# Patient Record
Sex: MALE | URBAN - METROPOLITAN AREA
[De-identification: names, ages, dates, MRNs, and addresses within clinical notes are randomized per-mention and may not be internally consistent; named-entity substitution may affect disease eponyms.]

---

## 2023-04-25 ENCOUNTER — INPATIENT (INPATIENT)
Facility: HOSPITAL | Age: 73
LOS: 5 days | Discharge: NURSING, REHAB OR LONG-TERM CARE | End: 2023-05-01
Attending: INTERNAL MEDICINE | Admitting: INTERNAL MEDICINE
Payer: MEDICARE

## 2023-04-25 VITALS
SYSTOLIC BLOOD PRESSURE: 73 MMHG | HEART RATE: 134 BPM | DIASTOLIC BLOOD PRESSURE: 51 MMHG | OXYGEN SATURATION: 94 % | RESPIRATION RATE: 50 BRPM

## 2023-04-25 DIAGNOSIS — Z87.81 PERSONAL HISTORY OF (HEALED) TRAUMATIC FRACTURE: Chronic | ICD-10-CM

## 2023-04-25 LAB
ALBUMIN SERPL ELPH-MCNC: 2.8 G/DL — LOW (ref 3.3–5)
ALP SERPL-CCNC: 173 U/L — HIGH (ref 40–120)
ALT FLD-CCNC: 41 U/L — SIGNIFICANT CHANGE UP (ref 12–78)
ANION GAP SERPL CALC-SCNC: 10 MMOL/L — SIGNIFICANT CHANGE UP (ref 5–17)
ANISOCYTOSIS BLD QL: SLIGHT — SIGNIFICANT CHANGE UP
APPEARANCE UR: ABNORMAL
APTT BLD: 26 SEC — LOW (ref 27.5–35.5)
AST SERPL-CCNC: 55 U/L — HIGH (ref 15–37)
BACTERIA # UR AUTO: ABNORMAL
BASOPHILS # BLD AUTO: 0 K/UL — SIGNIFICANT CHANGE UP (ref 0–0.2)
BASOPHILS NFR BLD AUTO: 0 % — SIGNIFICANT CHANGE UP (ref 0–2)
BILIRUB SERPL-MCNC: 1.6 MG/DL — HIGH (ref 0.2–1.2)
BILIRUB UR-MCNC: ABNORMAL
BUN SERPL-MCNC: 64 MG/DL — HIGH (ref 7–23)
CALCIUM SERPL-MCNC: 9.4 MG/DL — SIGNIFICANT CHANGE UP (ref 8.5–10.1)
CHLORIDE SERPL-SCNC: 142 MMOL/L — HIGH (ref 96–108)
CO2 SERPL-SCNC: 17 MMOL/L — LOW (ref 22–31)
COLOR SPEC: YELLOW — SIGNIFICANT CHANGE UP
CREAT SERPL-MCNC: 3.42 MG/DL — HIGH (ref 0.5–1.3)
D DIMER BLD IA.RAPID-MCNC: HIGH NG/ML DDU
DIFF PNL FLD: ABNORMAL
EGFR: 18 ML/MIN/1.73M2 — LOW
EOSINOPHIL # BLD AUTO: 0 K/UL — SIGNIFICANT CHANGE UP (ref 0–0.5)
EOSINOPHIL NFR BLD AUTO: 0 % — SIGNIFICANT CHANGE UP (ref 0–6)
GLUCOSE SERPL-MCNC: 171 MG/DL — HIGH (ref 70–99)
GLUCOSE UR QL: NEGATIVE MG/DL — SIGNIFICANT CHANGE UP
HCT VFR BLD CALC: 54.7 % — HIGH (ref 39–50)
HGB BLD-MCNC: 17 G/DL — SIGNIFICANT CHANGE UP (ref 13–17)
HYALINE CASTS # UR AUTO: ABNORMAL /LPF
INR BLD: 1.35 RATIO — HIGH (ref 0.88–1.16)
KETONES UR-MCNC: NEGATIVE — SIGNIFICANT CHANGE UP
LACTATE SERPL-SCNC: 7 MMOL/L — CRITICAL HIGH (ref 0.7–2)
LEUKOCYTE ESTERASE UR-ACNC: ABNORMAL
LYMPHOCYTES # BLD AUTO: 13 % — SIGNIFICANT CHANGE UP (ref 13–44)
LYMPHOCYTES # BLD AUTO: 2 K/UL — SIGNIFICANT CHANGE UP (ref 1–3.3)
MACROCYTES BLD QL: SLIGHT — SIGNIFICANT CHANGE UP
MANUAL SMEAR VERIFICATION: SIGNIFICANT CHANGE UP
MCHC RBC-ENTMCNC: 31.1 G/DL — LOW (ref 32–36)
MCHC RBC-ENTMCNC: 31.7 PG — SIGNIFICANT CHANGE UP (ref 27–34)
MCV RBC AUTO: 102.1 FL — HIGH (ref 80–100)
METAMYELOCYTES # FLD: 1 % — HIGH (ref 0–0)
MONOCYTES # BLD AUTO: 0.15 K/UL — SIGNIFICANT CHANGE UP (ref 0–0.9)
MONOCYTES NFR BLD AUTO: 1 % — LOW (ref 2–14)
NEUTROPHILS # BLD AUTO: 12.77 K/UL — HIGH (ref 1.8–7.4)
NEUTROPHILS NFR BLD AUTO: 64 % — SIGNIFICANT CHANGE UP (ref 43–77)
NEUTS BAND # BLD: 19 % — HIGH (ref 0–8)
NITRITE UR-MCNC: NEGATIVE — SIGNIFICANT CHANGE UP
NRBC # BLD: 0 /100 — SIGNIFICANT CHANGE UP (ref 0–0)
NRBC # BLD: SIGNIFICANT CHANGE UP /100 WBCS (ref 0–0)
PH UR: 5 — SIGNIFICANT CHANGE UP (ref 5–8)
PLAT MORPH BLD: NORMAL — SIGNIFICANT CHANGE UP
PLATELET # BLD AUTO: 113 K/UL — LOW (ref 150–400)
POTASSIUM SERPL-MCNC: 3.7 MMOL/L — SIGNIFICANT CHANGE UP (ref 3.5–5.3)
POTASSIUM SERPL-SCNC: 3.7 MMOL/L — SIGNIFICANT CHANGE UP (ref 3.5–5.3)
PROT SERPL-MCNC: 9.2 GM/DL — HIGH (ref 6–8.3)
PROT UR-MCNC: 30 MG/DL
PROTHROM AB SERPL-ACNC: 16.3 SEC — HIGH (ref 10.5–13.4)
RBC # BLD: 5.36 M/UL — SIGNIFICANT CHANGE UP (ref 4.2–5.8)
RBC # FLD: 13.7 % — SIGNIFICANT CHANGE UP (ref 10.3–14.5)
RBC BLD AUTO: ABNORMAL
RBC CASTS # UR COMP ASSIST: ABNORMAL /HPF (ref 0–4)
SODIUM SERPL-SCNC: 169 MMOL/L — CRITICAL HIGH (ref 135–145)
SP GR SPEC: 1.02 — SIGNIFICANT CHANGE UP (ref 1.01–1.02)
UROBILINOGEN FLD QL: 4 MG/DL
VARIANT LYMPHS # BLD: 2 % — SIGNIFICANT CHANGE UP (ref 0–6)
WBC # BLD: 15.39 K/UL — HIGH (ref 3.8–10.5)
WBC # FLD AUTO: 15.39 K/UL — HIGH (ref 3.8–10.5)
WBC UR QL: SIGNIFICANT CHANGE UP

## 2023-04-25 PROCEDURE — 99223 1ST HOSP IP/OBS HIGH 75: CPT

## 2023-04-25 PROCEDURE — 99291 CRITICAL CARE FIRST HOUR: CPT

## 2023-04-25 PROCEDURE — 93010 ELECTROCARDIOGRAM REPORT: CPT

## 2023-04-25 PROCEDURE — 71045 X-RAY EXAM CHEST 1 VIEW: CPT | Mod: 26

## 2023-04-25 RX ORDER — ONDANSETRON 8 MG/1
4 TABLET, FILM COATED ORAL EVERY 8 HOURS
Refills: 0 | Status: DISCONTINUED | OUTPATIENT
Start: 2023-04-25 | End: 2023-05-01

## 2023-04-25 RX ORDER — HEPARIN SODIUM 5000 [USP'U]/ML
5000 INJECTION INTRAVENOUS; SUBCUTANEOUS EVERY 8 HOURS
Refills: 0 | Status: DISCONTINUED | OUTPATIENT
Start: 2023-04-25 | End: 2023-04-25

## 2023-04-25 RX ORDER — HEPARIN SODIUM 5000 [USP'U]/ML
4000 INJECTION INTRAVENOUS; SUBCUTANEOUS ONCE
Refills: 0 | Status: COMPLETED | OUTPATIENT
Start: 2023-04-25 | End: 2023-04-25

## 2023-04-25 RX ORDER — SODIUM CHLORIDE 9 MG/ML
1000 INJECTION INTRAMUSCULAR; INTRAVENOUS; SUBCUTANEOUS
Refills: 0 | Status: DISCONTINUED | OUTPATIENT
Start: 2023-04-25 | End: 2023-04-25

## 2023-04-25 RX ORDER — VANCOMYCIN HCL 1 G
1000 VIAL (EA) INTRAVENOUS ONCE
Refills: 0 | Status: COMPLETED | OUTPATIENT
Start: 2023-04-25 | End: 2023-04-25

## 2023-04-25 RX ORDER — ACETAMINOPHEN 500 MG
750 TABLET ORAL ONCE
Refills: 0 | Status: COMPLETED | OUTPATIENT
Start: 2023-04-25 | End: 2023-04-25

## 2023-04-25 RX ORDER — LANOLIN ALCOHOL/MO/W.PET/CERES
3 CREAM (GRAM) TOPICAL AT BEDTIME
Refills: 0 | Status: DISCONTINUED | OUTPATIENT
Start: 2023-04-25 | End: 2023-05-01

## 2023-04-25 RX ORDER — PANTOPRAZOLE SODIUM 20 MG/1
40 TABLET, DELAYED RELEASE ORAL
Refills: 0 | Status: DISCONTINUED | OUTPATIENT
Start: 2023-04-25 | End: 2023-05-01

## 2023-04-25 RX ORDER — SODIUM CHLORIDE 9 MG/ML
1550 INJECTION INTRAMUSCULAR; INTRAVENOUS; SUBCUTANEOUS ONCE
Refills: 0 | Status: COMPLETED | OUTPATIENT
Start: 2023-04-25 | End: 2023-04-25

## 2023-04-25 RX ORDER — PIPERACILLIN AND TAZOBACTAM 4; .5 G/20ML; G/20ML
3.38 INJECTION, POWDER, LYOPHILIZED, FOR SOLUTION INTRAVENOUS EVERY 12 HOURS
Refills: 0 | Status: DISCONTINUED | OUTPATIENT
Start: 2023-04-25 | End: 2023-04-28

## 2023-04-25 RX ORDER — SODIUM CHLORIDE 9 MG/ML
1000 INJECTION INTRAMUSCULAR; INTRAVENOUS; SUBCUTANEOUS ONCE
Refills: 0 | Status: COMPLETED | OUTPATIENT
Start: 2023-04-25 | End: 2023-04-25

## 2023-04-25 RX ORDER — HEPARIN SODIUM 5000 [USP'U]/ML
INJECTION INTRAVENOUS; SUBCUTANEOUS
Qty: 25000 | Refills: 0 | Status: DISCONTINUED | OUTPATIENT
Start: 2023-04-25 | End: 2023-04-27

## 2023-04-25 RX ORDER — ACETAMINOPHEN 500 MG
650 TABLET ORAL EVERY 6 HOURS
Refills: 0 | Status: DISCONTINUED | OUTPATIENT
Start: 2023-04-25 | End: 2023-05-01

## 2023-04-25 RX ORDER — PIPERACILLIN AND TAZOBACTAM 4; .5 G/20ML; G/20ML
3.38 INJECTION, POWDER, LYOPHILIZED, FOR SOLUTION INTRAVENOUS ONCE
Refills: 0 | Status: COMPLETED | OUTPATIENT
Start: 2023-04-25 | End: 2023-04-25

## 2023-04-25 RX ORDER — HEPARIN SODIUM 5000 [USP'U]/ML
2000 INJECTION INTRAVENOUS; SUBCUTANEOUS EVERY 6 HOURS
Refills: 0 | Status: DISCONTINUED | OUTPATIENT
Start: 2023-04-25 | End: 2023-04-27

## 2023-04-25 RX ORDER — HEPARIN SODIUM 5000 [USP'U]/ML
4000 INJECTION INTRAVENOUS; SUBCUTANEOUS EVERY 6 HOURS
Refills: 0 | Status: DISCONTINUED | OUTPATIENT
Start: 2023-04-25 | End: 2023-04-27

## 2023-04-25 RX ORDER — SODIUM CHLORIDE 9 MG/ML
1000 INJECTION, SOLUTION INTRAVENOUS
Refills: 0 | Status: DISCONTINUED | OUTPATIENT
Start: 2023-04-25 | End: 2023-04-26

## 2023-04-25 RX ADMIN — HEPARIN SODIUM 4000 UNIT(S): 5000 INJECTION INTRAVENOUS; SUBCUTANEOUS at 23:57

## 2023-04-25 RX ADMIN — Medication 300 MILLIGRAM(S): at 17:31

## 2023-04-25 RX ADMIN — PIPERACILLIN AND TAZOBACTAM 200 GRAM(S): 4; .5 INJECTION, POWDER, LYOPHILIZED, FOR SOLUTION INTRAVENOUS at 15:46

## 2023-04-25 RX ADMIN — PIPERACILLIN AND TAZOBACTAM 3.38 GRAM(S): 4; .5 INJECTION, POWDER, LYOPHILIZED, FOR SOLUTION INTRAVENOUS at 16:00

## 2023-04-25 RX ADMIN — Medication 750 MILLIGRAM(S): at 18:00

## 2023-04-25 RX ADMIN — PIPERACILLIN AND TAZOBACTAM 25 GRAM(S): 4; .5 INJECTION, POWDER, LYOPHILIZED, FOR SOLUTION INTRAVENOUS at 22:12

## 2023-04-25 RX ADMIN — Medication 250 MILLIGRAM(S): at 19:43

## 2023-04-25 RX ADMIN — SODIUM CHLORIDE 100 MILLILITER(S): 9 INJECTION, SOLUTION INTRAVENOUS at 23:50

## 2023-04-25 RX ADMIN — SODIUM CHLORIDE 1550 MILLILITER(S): 9 INJECTION INTRAMUSCULAR; INTRAVENOUS; SUBCUTANEOUS at 15:46

## 2023-04-25 RX ADMIN — SODIUM CHLORIDE 1550 MILLILITER(S): 9 INJECTION INTRAMUSCULAR; INTRAVENOUS; SUBCUTANEOUS at 17:00

## 2023-04-25 RX ADMIN — SODIUM CHLORIDE 1000 MILLILITER(S): 9 INJECTION INTRAMUSCULAR; INTRAVENOUS; SUBCUTANEOUS at 21:52

## 2023-04-25 NOTE — H&P ADULT - ASSESSMENT
74 y/o M w/ PMHx of HTN, COPD, Dementia, Seizure disorder, CHF, Depression, BPH sent in from NH for respiratory distress, pt being admitted for Acute respiratory failure, severe sepsis due to HCAP    # Acute Respiratory failure  # HCAP w/ severe sepsis  - c/w Zosyn and Vanco   - f/u official XR report  - f/u blood cultures  - c/w supplemental oxygen    # TONNY  - BL renal function unknown  - c/w IVF  - avoid nephrotoxins    # Hypernatremia  - c/w 1/2 NS  - trend Na    # Bacteriuria  - c/w Zosyn  - f/u urine cultures         74 y/o M w/ PMHx of HTN, COPD, Dementia, Seizure disorder, CHF, Depression, BPH sent in from NH for respiratory distress, pt being admitted for Acute respiratory failure, severe sepsis due to HCAP    # Acute Hypoxic Respiratory failure  # severe sepsis  - CXR ? HCAP vs atelectasis   - Dimer [id number], lactate of 7 despite 2 L bolus  - treat for PE, place on Heparin gtt  - V/Q scan in am  - c/w Zosyn and Vanco   - f/u official XR report  - f/u blood cultures  - c/w supplemental oxygen    # TONNY  - BL renal function unknown  - c/w IVF  - avoid nephrotoxins    # Hypernatremia  - c/w 1/2 NS  - trend Na    # Bacteriuria  - c/w Zosyn  - f/u urine cultures    # Seizure disorder  - c/w Phenytoin  - f/u level    # DVT ppx - on heparin gtt for now    Pt DNR/DNI      Clear bilaterally, pupils equal, round and reactive to light.

## 2023-04-25 NOTE — H&P ADULT - NSHPLABSRESULTS_GEN_ALL_CORE
T(C): 36.1 (23 @ 20:34), Max: 39.1 (23 @ 16:27)  HR: 87 (23 @ 20:34) (87 - 134)  BP: 100/69 (23 @ 20:34) (73/51 - 119/97)  RR: 20 (23 @ 20:34) (20 - 50)  SpO2: 98% (23 @ 20:34) (83% - 100%)                        17.0   15.39 )-----------( 113      ( 2023 15:35 )             54.7         169<HH>  |  142<H>  |  64<H>  ----------------------------<  171<H>  3.7   |  17<L>  |  3.42<H>    Ca    9.4      2023 15:35    TPro  9.2<H>  /  Alb  2.8<L>  /  TBili  1.6<H>  /  DBili  x   /  AST  55<H>  /  ALT  41  /  AlkPhos  173<H>      LIVER FUNCTIONS - ( 2023 15:35 )  Alb: 2.8 g/dL / Pro: 9.2 gm/dL / ALK PHOS: 173 U/L / ALT: 41 U/L / AST: 55 U/L / GGT: x           PT/INR - ( 2023 15:35 )   PT: 16.3 sec;   INR: 1.35 ratio         PTT - ( 2023 15:35 )  PTT:26.0 sec  Urinalysis Basic - ( 2023 16:10 )    Color: Yellow / Appearance: Slightly Turbid / S.020 / pH: x  Gluc: x / Ketone: Negative  / Bili: Small / Urobili: 4 mg/dL   Blood: x / Protein: 30 mg/dL / Nitrite: Negative   Leuk Esterase: Trace / RBC: 3-5 /HPF / WBC 3-5   Sq Epi: x / Non Sq Epi: x / Bacteria: Many      acetaminophen     Tablet .. 650 milliGRAM(s) Oral every 6 hours PRN  aluminum hydroxide/magnesium hydroxide/simethicone Suspension 30 milliLiter(s) Oral every 4 hours PRN  heparin   Injectable 2000 Unit(s) IV Push every 6 hours PRN  heparin   Injectable 4000 Unit(s) IV Push once  heparin   Injectable 4000 Unit(s) IV Push every 6 hours PRN  heparin  Infusion.  Unit(s)/Hr IV Continuous <Continuous>  melatonin 3 milliGRAM(s) Oral at bedtime PRN  multivitamin 1 Tablet(s) Oral daily  ondansetron Injectable 4 milliGRAM(s) IV Push every 8 hours PRN  pantoprazole    Tablet 40 milliGRAM(s) Oral before breakfast  phenytoin   Capsule 200 milliGRAM(s) Oral two times a day  piperacillin/tazobactam IVPB.. 3.375 Gram(s) IV Intermittent every 12 hours  sodium chloride 0.45%. 1000 milliLiter(s) IV Continuous <Continuous>

## 2023-04-25 NOTE — H&P ADULT - NSHPPHYSICALEXAM_GEN_ALL_CORE
PHYSICAL EXAM:    Vital Signs Last 24 Hrs  T(C): 36.1 (25 Apr 2023 20:34), Max: 39.1 (25 Apr 2023 16:27)  T(F): 97 (25 Apr 2023 20:34), Max: 102.3 (25 Apr 2023 16:27)  HR: 87 (25 Apr 2023 20:34) (87 - 134)  BP: 100/69 (25 Apr 2023 20:34) (73/51 - 119/97)  BP(mean): --  RR: 20 (25 Apr 2023 20:34) (20 - 50)  SpO2: 98% (25 Apr 2023 20:34) (83% - 100%)    Parameters below as of 25 Apr 2023 20:34  Patient On (Oxygen Delivery Method): nasal cannula  O2 Flow (L/min): 4      GENERAL: Pt lying in bed comfortably in NAD  HEENT:  Atraumatic, EOMI, PERRL, conjunctiva and sclera clear, MMM  NECK: Supple, No JVD  CHEST/LUNG: Clear to auscultation bilaterally; No rales, rhonchi, wheezing or rubs. Unlabored respirations  HEART: Regular rate and rhythm; No murmurs, rubs, or gallops  ABDOMEN: Bowel sounds present; Soft, Nontender, Nondistended. No guarding or rigidity    EXTREMITIES:  2+ Peripheral Pulses, brisk capillary refill. No clubbing, cyanosis, or edema  NEUROLOGICAL:  Alert & Oriented X3, speech clear. Answers questions appropriately. Full and equal strength B/L upper and lower extremities. No deficits   MSK: FROM x 4 extremities   SKIN: No rashes or lesions PHYSICAL EXAM:    Vital Signs Last 24 Hrs  T(C): 36.1 (25 Apr 2023 20:34), Max: 39.1 (25 Apr 2023 16:27)  T(F): 97 (25 Apr 2023 20:34), Max: 102.3 (25 Apr 2023 16:27)  HR: 87 (25 Apr 2023 20:34) (87 - 134)  BP: 100/69 (25 Apr 2023 20:34) (73/51 - 119/97)  BP(mean): --  RR: 20 (25 Apr 2023 20:34) (20 - 50)  SpO2: 98% (25 Apr 2023 20:34) (83% - 100%)    Parameters below as of 25 Apr 2023 20:34  Patient On (Oxygen Delivery Method): nasal cannula  O2 Flow (L/min): 4      GENERAL: Pt lying in bed   HEENT:  Atraumatic, EOMI, PERRL, conjunctiva and sclera clear, VERY dry MM, pood dentition  NECK: Supple,  CHEST/LUNG: Clear to auscultation bilaterally  HEART: Regular rate and rhythm  ABDOMEN: Bowel sounds present; Soft, Nontender, Nondistended.  EXTREMITIES:  2+ Peripheral Pulses, brisk capillary refill. No edema  NEUROLOGICAL:  Alert mildly agitated, rest of exam limited   MSK: no overt deformity  SKIN: warm, dry

## 2023-04-25 NOTE — ED ADULT TRIAGE NOTE - CHIEF COMPLAINT QUOTE
Patient BIBA respiratory distress from [facility]. On nonrebreather  DNI/DNR

## 2023-04-25 NOTE — ED ADULT TRIAGE NOTE - HEIGHT IN CM
160.02 Dutasteride Pregnancy And Lactation Text: This medication is absolutely contraindicated in women, especially during pregnancy and breast feeding. Feminization of male fetuses is possible if taking while pregnant.

## 2023-04-25 NOTE — ED ADULT NURSE NOTE - NSIMPLEMENTINTERV_GEN_ALL_ED
Implemented All Fall Risk Interventions:  [place] to call system. Call bell, personal items and telephone within reach. Instruct patient to call for assistance. Room bathroom lighting operational. Non-slip footwear when patient is off stretcher. Physically safe environment: no spills, clutter or unnecessary equipment. Stretcher in lowest position, wheels locked, appropriate side rails in place. Provide visual cue, wrist band, yellow gown, etc. Monitor gait and stability. Monitor for mental status changes and reorient to person, place, and time. Review medications for side effects contributing to fall risk. Reinforce activity limits and safety measures with patient and family.

## 2023-04-25 NOTE — ED PROVIDER NOTE - PHYSICAL EXAMINATION
Kevin:  General: No distress.  altered  HEENT: WNL  Chest/Lungs: scattered wheeze, No retractions, No increased work of breathing, Normal rate  Heart: S1S2 RRR, No M/R/G, Pules equal Bilaterally in upper and lower extremities distally  Abd: soft, NT/ND, No guarding, No rebound.  No hernias, no palpable masses.  Extrem: FROM in all joints, no significant edema noted, No ulcers.  Cap refil < 2sec.  Skin: No rash noted, warm dry.  Neuro:  Grossly normal.  No difficulty ambulating. No focal deficits.  Psychiatric: No evidence of delusions. No SI/HI.

## 2023-04-25 NOTE — ED ADULT NURSE NOTE - OBJECTIVE STATEMENT
Patient BIBA respiratory distress from [facility]. On nonrebreather  DNI/DNR   pt placed in RES A bed blue MD at bedside pt placed on monitor IV established labs drawn pt made comfortable pt responsive to painful stimuli moves madeleine and moves eye but with no verbal response pt warm to touch will continue to monitor

## 2023-04-25 NOTE — H&P ADULT - HISTORY OF PRESENT ILLNESS
72 y/o M w/ PMHx of HTN, COPD, Dementia sent in from NH for respiratory distress. Pt lethargic, unable to obtain further hx    ED Course  Vitals BP 81/47, , RR 39 Tmax 102.3  Labs WBC 15.59 19% bands Na 169, Cl 142, BUN/Cr 64/3.42 CXR pre-england R sided infiltrate, UA +ve bacteriuria  Pt given 1.6 L bolus and Zosyn    72 y/o M w/ PMHx of HTN, COPD, Dementia, Seizure disorder, CHF, Depression, BPH sent in from NH for respiratory distress. Pt lethargic, unable to obtain further hx. Per EMS report, RN at [facility] found pt around 1pm in respiratory distress SPO2 665 on RA, pt also noted to have a fever.     ED Course  Vitals BP 81/47, , RR 39 Tmax 102.3  Labs WBC 15.59 19% bands Na 169, Cl 142, BUN/Cr 64/3.42 CXR pre-england R sided infiltrate, UA +ve bacteriuria  Pt given 1.6 L bolus and Zosyn    74 y/o M w/ PMHx of HTN, COPD, Dementia, Seizure disorder, CHF, Depression, BPH sent in from NH for respiratory distress. Pt awake, unable to obtain further hx. Per EMS report, RN at [facility] found pt around 1pm in respiratory distress SPO2 65 on RA, pt also noted to have a fever.     ED Course  Vitals BP 81/47, , RR 39 Tmax 102.3  Labs WBC 15.59 19% bands Na 169, Cl 142, BUN/Cr 64/3.42 CXR pre-england R sided infiltrate, UA +ve bacteriuria  Pt given 1.6 L bolus and Zosyn     74 y/o M w/ PMHx of HTN, COPD, Dementia, Seizure disorder, CHF, Depression, BPH sent in from NH for respiratory distress. Pt awake, confused, kept repeating "I want to go home" unable to obtain further hx due to MS Per EMS report, RN at [facility] found pt around 1pm in respiratory distress SPO2 65 on RA, pt also noted to have a fever.     ED Course  Initial itals BP 73/51, , RR 50 Tmax 102.3 SPO2 94% on 100% NRB  Labs WBC 15.59 19% bands Na 169, Cl 142, BUN/Cr 64/3.42 CXR pre-england R sided infiltrate, UA +ve bacteriuria  Pt given 1.6 L bolus and Zosyn

## 2023-04-26 LAB
ALBUMIN SERPL ELPH-MCNC: 2.3 G/DL — LOW (ref 3.3–5)
ALP SERPL-CCNC: 117 U/L — SIGNIFICANT CHANGE UP (ref 40–120)
ALT FLD-CCNC: 44 U/L — SIGNIFICANT CHANGE UP (ref 12–78)
ANION GAP SERPL CALC-SCNC: 3 MMOL/L — LOW (ref 5–17)
ANION GAP SERPL CALC-SCNC: 6 MMOL/L — SIGNIFICANT CHANGE UP (ref 5–17)
ANION GAP SERPL CALC-SCNC: 6 MMOL/L — SIGNIFICANT CHANGE UP (ref 5–17)
APTT BLD: >200 SEC — CRITICAL HIGH (ref 27.5–35.5)
APTT BLD: >200 SEC — SIGNIFICANT CHANGE UP (ref 27.5–35.5)
AST SERPL-CCNC: 96 U/L — HIGH (ref 15–37)
BASOPHILS # BLD AUTO: 0.09 K/UL — SIGNIFICANT CHANGE UP (ref 0–0.2)
BASOPHILS NFR BLD AUTO: 0.7 % — SIGNIFICANT CHANGE UP (ref 0–2)
BILIRUB SERPL-MCNC: 1.2 MG/DL — SIGNIFICANT CHANGE UP (ref 0.2–1.2)
BUN SERPL-MCNC: 48 MG/DL — HIGH (ref 7–23)
BUN SERPL-MCNC: 61 MG/DL — HIGH (ref 7–23)
BUN SERPL-MCNC: 71 MG/DL — HIGH (ref 7–23)
CALCIUM SERPL-MCNC: 5.2 MG/DL — CRITICAL LOW (ref 8.5–10.1)
CALCIUM SERPL-MCNC: 7.6 MG/DL — LOW (ref 8.5–10.1)
CALCIUM SERPL-MCNC: 8.2 MG/DL — LOW (ref 8.5–10.1)
CHLORIDE SERPL-SCNC: 135 MMOL/L — HIGH (ref 96–108)
CHLORIDE SERPL-SCNC: 139 MMOL/L — HIGH (ref 96–108)
CHLORIDE SERPL-SCNC: 142 MMOL/L — HIGH (ref 96–108)
CO2 SERPL-SCNC: 15 MMOL/L — LOW (ref 22–31)
CO2 SERPL-SCNC: 19 MMOL/L — LOW (ref 22–31)
CO2 SERPL-SCNC: 20 MMOL/L — LOW (ref 22–31)
CREAT SERPL-MCNC: 2.35 MG/DL — HIGH (ref 0.5–1.3)
CREAT SERPL-MCNC: 3.36 MG/DL — HIGH (ref 0.5–1.3)
CREAT SERPL-MCNC: 3.58 MG/DL — HIGH (ref 0.5–1.3)
CULTURE RESULTS: NO GROWTH — SIGNIFICANT CHANGE UP
EGFR: 17 ML/MIN/1.73M2 — LOW
EGFR: 19 ML/MIN/1.73M2 — LOW
EGFR: 29 ML/MIN/1.73M2 — LOW
EOSINOPHIL # BLD AUTO: 0.02 K/UL — SIGNIFICANT CHANGE UP (ref 0–0.5)
EOSINOPHIL NFR BLD AUTO: 0.2 % — SIGNIFICANT CHANGE UP (ref 0–6)
GLUCOSE SERPL-MCNC: 155 MG/DL — HIGH (ref 70–99)
GLUCOSE SERPL-MCNC: 241 MG/DL — HIGH (ref 70–99)
GLUCOSE SERPL-MCNC: 273 MG/DL — HIGH (ref 70–99)
HCT VFR BLD CALC: 45.6 % — SIGNIFICANT CHANGE UP (ref 39–50)
HGB BLD-MCNC: 14.3 G/DL — SIGNIFICANT CHANGE UP (ref 13–17)
IMM GRANULOCYTES NFR BLD AUTO: 0.8 % — SIGNIFICANT CHANGE UP (ref 0–0.9)
LACTATE SERPL-SCNC: 2.3 MMOL/L — HIGH (ref 0.7–2)
LACTATE SERPL-SCNC: 6.1 MMOL/L — CRITICAL HIGH (ref 0.7–2)
LYMPHOCYTES # BLD AUTO: 17.9 % — SIGNIFICANT CHANGE UP (ref 13–44)
LYMPHOCYTES # BLD AUTO: 2.28 K/UL — SIGNIFICANT CHANGE UP (ref 1–3.3)
MAGNESIUM SERPL-MCNC: 2.4 MG/DL — SIGNIFICANT CHANGE UP (ref 1.6–2.6)
MCHC RBC-ENTMCNC: 31.3 PG — SIGNIFICANT CHANGE UP (ref 27–34)
MCHC RBC-ENTMCNC: 31.4 G/DL — LOW (ref 32–36)
MCV RBC AUTO: 99.8 FL — SIGNIFICANT CHANGE UP (ref 80–100)
MONOCYTES # BLD AUTO: 0.79 K/UL — SIGNIFICANT CHANGE UP (ref 0–0.9)
MONOCYTES NFR BLD AUTO: 6.2 % — SIGNIFICANT CHANGE UP (ref 2–14)
NEUTROPHILS # BLD AUTO: 9.45 K/UL — HIGH (ref 1.8–7.4)
NEUTROPHILS NFR BLD AUTO: 74.2 % — SIGNIFICANT CHANGE UP (ref 43–77)
NRBC # BLD: 0 /100 WBCS — SIGNIFICANT CHANGE UP (ref 0–0)
PHENYTOIN FREE SERPL-MCNC: 7.3 UG/ML — LOW (ref 10–20)
PLATELET # BLD AUTO: 62 K/UL — LOW (ref 150–400)
POTASSIUM SERPL-MCNC: 2.6 MMOL/L — CRITICAL LOW (ref 3.5–5.3)
POTASSIUM SERPL-MCNC: 2.9 MMOL/L — CRITICAL LOW (ref 3.5–5.3)
POTASSIUM SERPL-MCNC: 3.5 MMOL/L — SIGNIFICANT CHANGE UP (ref 3.5–5.3)
POTASSIUM SERPL-SCNC: 2.6 MMOL/L — CRITICAL LOW (ref 3.5–5.3)
POTASSIUM SERPL-SCNC: 2.9 MMOL/L — CRITICAL LOW (ref 3.5–5.3)
POTASSIUM SERPL-SCNC: 3.5 MMOL/L — SIGNIFICANT CHANGE UP (ref 3.5–5.3)
PROT SERPL-MCNC: 7.6 GM/DL — SIGNIFICANT CHANGE UP (ref 6–8.3)
RBC # BLD: 4.57 M/UL — SIGNIFICANT CHANGE UP (ref 4.2–5.8)
RBC # FLD: 13.6 % — SIGNIFICANT CHANGE UP (ref 10.3–14.5)
SODIUM SERPL-SCNC: 156 MMOL/L — HIGH (ref 135–145)
SODIUM SERPL-SCNC: 164 MMOL/L — CRITICAL HIGH (ref 135–145)
SODIUM SERPL-SCNC: 165 MMOL/L — CRITICAL HIGH (ref 135–145)
SPECIMEN SOURCE: SIGNIFICANT CHANGE UP
WBC # BLD: 12.73 K/UL — HIGH (ref 3.8–10.5)
WBC # FLD AUTO: 12.73 K/UL — HIGH (ref 3.8–10.5)

## 2023-04-26 PROCEDURE — 99233 SBSQ HOSP IP/OBS HIGH 50: CPT

## 2023-04-26 PROCEDURE — 78582 LUNG VENTILAT&PERFUS IMAGING: CPT | Mod: 26

## 2023-04-26 RX ORDER — POTASSIUM CHLORIDE 20 MEQ
40 PACKET (EA) ORAL ONCE
Refills: 0 | Status: DISCONTINUED | OUTPATIENT
Start: 2023-04-26 | End: 2023-04-26

## 2023-04-26 RX ORDER — POTASSIUM CHLORIDE 20 MEQ
20 PACKET (EA) ORAL
Refills: 0 | Status: DISCONTINUED | OUTPATIENT
Start: 2023-04-26 | End: 2023-04-26

## 2023-04-26 RX ORDER — POTASSIUM CHLORIDE 20 MEQ
40 PACKET (EA) ORAL EVERY 4 HOURS
Refills: 0 | Status: DISCONTINUED | OUTPATIENT
Start: 2023-04-26 | End: 2023-04-26

## 2023-04-26 RX ORDER — POTASSIUM CHLORIDE 20 MEQ
40 PACKET (EA) ORAL EVERY 4 HOURS
Refills: 0 | Status: COMPLETED | OUTPATIENT
Start: 2023-04-26 | End: 2023-04-26

## 2023-04-26 RX ORDER — SODIUM CHLORIDE 9 MG/ML
1000 INJECTION, SOLUTION INTRAVENOUS
Refills: 0 | Status: COMPLETED | OUTPATIENT
Start: 2023-04-26 | End: 2023-04-26

## 2023-04-26 RX ORDER — SODIUM CHLORIDE 9 MG/ML
1000 INJECTION, SOLUTION INTRAVENOUS
Refills: 0 | Status: COMPLETED | OUTPATIENT
Start: 2023-04-26 | End: 2023-04-27

## 2023-04-26 RX ADMIN — HEPARIN SODIUM 700 UNIT(S)/HR: 5000 INJECTION INTRAVENOUS; SUBCUTANEOUS at 23:58

## 2023-04-26 RX ADMIN — HEPARIN SODIUM 0 UNIT(S)/HR: 5000 INJECTION INTRAVENOUS; SUBCUTANEOUS at 12:36

## 2023-04-26 RX ADMIN — Medication 200 MILLIGRAM(S): at 17:06

## 2023-04-26 RX ADMIN — Medication 40 MILLIEQUIVALENT(S): at 21:12

## 2023-04-26 RX ADMIN — HEPARIN SODIUM 900 UNIT(S)/HR: 5000 INJECTION INTRAVENOUS; SUBCUTANEOUS at 07:33

## 2023-04-26 RX ADMIN — Medication 40 MILLIEQUIVALENT(S): at 17:07

## 2023-04-26 RX ADMIN — PIPERACILLIN AND TAZOBACTAM 25 GRAM(S): 4; .5 INJECTION, POWDER, LYOPHILIZED, FOR SOLUTION INTRAVENOUS at 17:07

## 2023-04-26 RX ADMIN — HEPARIN SODIUM 0 UNIT(S)/HR: 5000 INJECTION INTRAVENOUS; SUBCUTANEOUS at 22:50

## 2023-04-26 RX ADMIN — PIPERACILLIN AND TAZOBACTAM 25 GRAM(S): 4; .5 INJECTION, POWDER, LYOPHILIZED, FOR SOLUTION INTRAVENOUS at 06:28

## 2023-04-26 RX ADMIN — HEPARIN SODIUM 900 UNIT(S)/HR: 5000 INJECTION INTRAVENOUS; SUBCUTANEOUS at 03:49

## 2023-04-26 RX ADMIN — HEPARIN SODIUM 900 UNIT(S)/HR: 5000 INJECTION INTRAVENOUS; SUBCUTANEOUS at 00:02

## 2023-04-26 RX ADMIN — Medication 1 TABLET(S): at 17:06

## 2023-04-26 RX ADMIN — PANTOPRAZOLE SODIUM 40 MILLIGRAM(S): 20 TABLET, DELAYED RELEASE ORAL at 06:28

## 2023-04-26 RX ADMIN — Medication 200 MILLIGRAM(S): at 06:28

## 2023-04-26 RX ADMIN — SODIUM CHLORIDE 150 MILLILITER(S): 9 INJECTION, SOLUTION INTRAVENOUS at 20:50

## 2023-04-26 RX ADMIN — HEPARIN SODIUM 800 UNIT(S)/HR: 5000 INJECTION INTRAVENOUS; SUBCUTANEOUS at 14:23

## 2023-04-26 RX ADMIN — HEPARIN SODIUM 900 UNIT(S)/HR: 5000 INJECTION INTRAVENOUS; SUBCUTANEOUS at 07:36

## 2023-04-26 RX ADMIN — HEPARIN SODIUM 800 UNIT(S)/HR: 5000 INJECTION INTRAVENOUS; SUBCUTANEOUS at 19:46

## 2023-04-26 RX ADMIN — SODIUM CHLORIDE 1000 MILLILITER(S): 9 INJECTION, SOLUTION INTRAVENOUS at 20:50

## 2023-04-26 NOTE — ED ADULT NURSE REASSESSMENT NOTE - NS ED NURSE REASSESS COMMENT FT1
Clarify Potassium PO with Dr Rapp, Pt cant tolerate Big pill.
D dimer elevated, pt remains sating well on 4L NC, not in respiratory distress. Dr. Leigh aware, treatment as per orders. Pt not upgraded to telemetry at this time.
Pt fluids ran longer than prescribed time due to IV site in the AC and pt unable to follow commands. Dr. Leigh aware. Elevated lactate level Another fluid bolus prescribed to run over an hour. Pt hx of CHF, started fluids slow.
Report from Fritz RN. Introduced self and identified pt. Received pt alert & confused, anxiously moving around in stretcher. IV intact, flushes, large amount of bruising around the site. Pt on NRB mask, saturation hard to obtain due to movement, pt unable to follow commands. IV Vancomycin over due and lactate due to be draw. On cardiac monitor NSR.
Pt continues to pull at lines, removing NRB mask. Dr. Leigh at the bedside, placed on 4L NC, maintaining o2 sat of 94-95%, again difficult to obtain good pleth due to patient movement and pulling at lines.

## 2023-04-26 NOTE — CONSULT NOTE ADULT - SUBJECTIVE AND OBJECTIVE BOX
Patient chart reviewed, full consult to follow.     Thank you for the courtesy of this consultation. [facility] NEPHROLOGY [facility], [facility]  NEPHROLOGY AND HYPERTENSION  [address]  [address]  [place] [address]  [phone]    MD WILTON SOLORZANO MD YELENA ROSENBERG, MD BINNY KOSHY, MD CHRISTOPHER CAPUTO, MD EDWARD BOVER, MD      Information from chart:  "Patient is a 73y old  Male who presents with a chief complaint of Severe Sepsis, Hypoxic respiratory failure (2023 16:27)    HPI:      72 y/o M w/ PMHx of HTN, COPD, Dementia, Seizure disorder, CHF, Depression, BPH sent in from NH for respiratory distress. Pt awake, confused, kept repeating "I want to go home" unable to obtain further hx due to MS Per EMS report, RN at [facility] found pt around 1pm in respiratory distress SPO2 65 on RA, pt also noted to have a fever.     ED Course  Initial itals BP 73/51, , RR 50 Tmax 102.3 SPO2 94% on 100% NRB  Labs WBC 15.59 19% bands Na 169, Cl 142, BUN/Cr 64/3.42 CXR pre-england R sided infiltrate, UA +ve bacteriuria  Pt given 1.6 L bolus and Zosyn (2023 18:16)   "    Noted to have hypernatremia ; Cr 3's   PAST MEDICAL & SURGICAL HISTORY:  History of cognitive deficit      COPD, moderate      Chronic CHF      S/p tibial fracture        FAMILY HISTORY:  No pertinent family history in first degree relatives      Allergies    No Known Allergies    Intolerances      Home Medications:  midodrine 10 mg oral tablet: 1 tab(s) orally once a day as needed for low BP (2023 22:38)  Multiple Vitamins oral tablet: 1 orally once a day (2023 22:38)  omeprazole 40 mg oral delayed release capsule: 1 orally once a day (2023 22:38)  phenytoin 200 mg oral capsule, extended release: 1 orally 2 times a day (2023 22:39)  Tylenol 325 mg oral tablet: 2 tab(s) orally every 6 hours as needed for  mild pain (2023 22:37)    MEDICATIONS  (STANDING):  dextrose 5%. 1000 milliLiter(s) (150 mL/Hr) IV Continuous <Continuous>  heparin  Infusion.  Unit(s)/Hr (9 mL/Hr) IV Continuous <Continuous>  multivitamin 1 Tablet(s) Oral daily  pantoprazole    Tablet 40 milliGRAM(s) Oral before breakfast  phenytoin   Capsule 200 milliGRAM(s) Oral two times a day  piperacillin/tazobactam IVPB.. 3.375 Gram(s) IV Intermittent every 12 hours    MEDICATIONS  (PRN):  acetaminophen     Tablet .. 650 milliGRAM(s) Oral every 6 hours PRN Temp greater or equal to 38C (100.4F), Mild Pain (1 - 3)  aluminum hydroxide/magnesium hydroxide/simethicone Suspension 30 milliLiter(s) Oral every 4 hours PRN Dyspepsia  heparin   Injectable 4000 Unit(s) IV Push every 6 hours PRN For aPTT less than 40  heparin   Injectable 2000 Unit(s) IV Push every 6 hours PRN For aPTT between 40 - 57  melatonin 3 milliGRAM(s) Oral at bedtime PRN Insomnia  ondansetron Injectable 4 milliGRAM(s) IV Push every 8 hours PRN Nausea and/or Vomiting    Vital Signs Last 24 Hrs  T(C): 36.3 (2023 16:58), Max: 36.6 (2023 04:00)  T(F): 97.3 (2023 16:58), Max: 97.8 (2023 04:00)  HR: 82 (2023 16:58) (74 - 86)  BP: 109/71 (2023 16:58) (100/71 - 119/97)  BP(mean): --  RR: 19 (2023 16:58) (15 - 23)  SpO2: 94% (2023 16:58) (93% - 97%)    Parameters below as of 2023 16:58  Patient On (Oxygen Delivery Method): nasal cannula  O2 Flow (L/min): 6      Daily     Daily Weight in k.3 (2023 04:00)    23 @ 07:  -  23 @ 23:15  --------------------------------------------------------  IN: 260 mL / OUT: 0 mL / NET: 260 mL      CAPILLARY BLOOD GLUCOSE        PHYSICAL EXAM:      T(C): 36.3 (23 @ 16:58), Max: 36.6 (23 @ 04:00)  HR: 82 (23 @ 16:58) (74 - 86)  BP: 109/71 (23 @ 16:58) (100/71 - 119/97)  RR: 19 (23 @ 16:58) (15 - 23)  SpO2: 94% (23 @ 16:58) (93% - 97%)  Wt(kg): --  Lungs clear  Heart S1S2  Abd soft NT ND  Extremities:   tr edema                  164<HH>  |  142<H>  |  71<H>  ----------------------------<  155<H>  2.9<LL>   |  19<L>  |  3.58<H>    Ca    8.2<L>      2023 10:47  Mg     2.4         TPro  7.6  /  Alb  2.3<L>  /  TBili  1.2  /  DBili  x   /  AST  96<H>  /  ALT  44  /  AlkPhos  117                            14.3   12.73 )-----------( 62       ( 2023 10:47 )             45.6     Creatinine Trend: 3.58<--, 3.36<--, 2.35<--, 3.42<--  Urinalysis Basic - ( 2023 16:10 )    Color: Yellow / Appearance: Slightly Turbid / S.020 / pH: x  Gluc: x / Ketone: Negative  / Bili: Small / Urobili: 4 mg/dL   Blood: x / Protein: 30 mg/dL / Nitrite: Negative   Leuk Esterase: Trace / RBC: 3-5 /HPF / WBC 3-5   Sq Epi: x / Non Sq Epi: x / Bacteria: Many            Assessment   TONNY CKD degree unclear; hypernatremia; PNA   R/O PE    Plan  Continue IVF D5W;   Renal imaging   Will follow     Bassam Tyson MD        Patient chart reviewed, full consult to follow.     Thank you for the courtesy of this consultation.

## 2023-04-27 LAB
ANION GAP SERPL CALC-SCNC: 4 MMOL/L — LOW (ref 5–17)
APTT BLD: 143.4 SEC — CRITICAL HIGH (ref 27.5–35.5)
BASOPHILS # BLD AUTO: 0.09 K/UL — SIGNIFICANT CHANGE UP (ref 0–0.2)
BASOPHILS NFR BLD AUTO: 0.7 % — SIGNIFICANT CHANGE UP (ref 0–2)
BUN SERPL-MCNC: 69 MG/DL — HIGH (ref 7–23)
CA-I BLD-SCNC: <3 MG/DL — LOW (ref 4.5–5.6)
CALCIUM SERPL-MCNC: 8.2 MG/DL — LOW (ref 8.5–10.1)
CHLORIDE SERPL-SCNC: 132 MMOL/L — HIGH (ref 96–108)
CO2 SERPL-SCNC: 20 MMOL/L — LOW (ref 22–31)
CREAT SERPL-MCNC: 3.42 MG/DL — HIGH (ref 0.5–1.3)
EGFR: 18 ML/MIN/1.73M2 — LOW
EOSINOPHIL # BLD AUTO: 0.3 K/UL — SIGNIFICANT CHANGE UP (ref 0–0.5)
EOSINOPHIL NFR BLD AUTO: 2.4 % — SIGNIFICANT CHANGE UP (ref 0–6)
GLUCOSE SERPL-MCNC: 138 MG/DL — HIGH (ref 70–99)
HCT VFR BLD CALC: 41.2 % — SIGNIFICANT CHANGE UP (ref 39–50)
HGB BLD-MCNC: 13 G/DL — SIGNIFICANT CHANGE UP (ref 13–17)
IMM GRANULOCYTES NFR BLD AUTO: 1.3 % — HIGH (ref 0–0.9)
LACTATE SERPL-SCNC: 2.6 MMOL/L — HIGH (ref 0.7–2)
LYMPHOCYTES # BLD AUTO: 16 % — SIGNIFICANT CHANGE UP (ref 13–44)
LYMPHOCYTES # BLD AUTO: 2.04 K/UL — SIGNIFICANT CHANGE UP (ref 1–3.3)
MCHC RBC-ENTMCNC: 31.6 G/DL — LOW (ref 32–36)
MCHC RBC-ENTMCNC: 31.6 PG — SIGNIFICANT CHANGE UP (ref 27–34)
MCV RBC AUTO: 100 FL — SIGNIFICANT CHANGE UP (ref 80–100)
MONOCYTES # BLD AUTO: 0.8 K/UL — SIGNIFICANT CHANGE UP (ref 0–0.9)
MONOCYTES NFR BLD AUTO: 6.3 % — SIGNIFICANT CHANGE UP (ref 2–14)
NEUTROPHILS # BLD AUTO: 9.35 K/UL — HIGH (ref 1.8–7.4)
NEUTROPHILS NFR BLD AUTO: 73.3 % — SIGNIFICANT CHANGE UP (ref 43–77)
NRBC # BLD: 0 /100 WBCS — SIGNIFICANT CHANGE UP (ref 0–0)
PLATELET # BLD AUTO: 58 K/UL — LOW (ref 150–400)
POTASSIUM SERPL-MCNC: 3.1 MMOL/L — LOW (ref 3.5–5.3)
POTASSIUM SERPL-SCNC: 3.1 MMOL/L — LOW (ref 3.5–5.3)
RBC # BLD: 4.12 M/UL — LOW (ref 4.2–5.8)
RBC # FLD: 13.8 % — SIGNIFICANT CHANGE UP (ref 10.3–14.5)
SODIUM SERPL-SCNC: 156 MMOL/L — HIGH (ref 135–145)
WBC # BLD: 12.75 K/UL — HIGH (ref 3.8–10.5)
WBC # FLD AUTO: 12.75 K/UL — HIGH (ref 3.8–10.5)

## 2023-04-27 PROCEDURE — 99233 SBSQ HOSP IP/OBS HIGH 50: CPT

## 2023-04-27 RX ORDER — HEPARIN SODIUM 5000 [USP'U]/ML
5000 INJECTION INTRAVENOUS; SUBCUTANEOUS EVERY 8 HOURS
Refills: 0 | Status: DISCONTINUED | OUTPATIENT
Start: 2023-04-27 | End: 2023-05-01

## 2023-04-27 RX ORDER — POTASSIUM CHLORIDE 20 MEQ
20 PACKET (EA) ORAL
Refills: 0 | Status: COMPLETED | OUTPATIENT
Start: 2023-04-27 | End: 2023-04-27

## 2023-04-27 RX ORDER — SODIUM CHLORIDE 9 MG/ML
1000 INJECTION, SOLUTION INTRAVENOUS
Refills: 0 | Status: DISCONTINUED | OUTPATIENT
Start: 2023-04-27 | End: 2023-05-01

## 2023-04-27 RX ADMIN — HEPARIN SODIUM 0 UNIT(S)/HR: 5000 INJECTION INTRAVENOUS; SUBCUTANEOUS at 08:27

## 2023-04-27 RX ADMIN — SODIUM CHLORIDE 150 MILLILITER(S): 9 INJECTION, SOLUTION INTRAVENOUS at 05:11

## 2023-04-27 RX ADMIN — HEPARIN SODIUM 700 UNIT(S)/HR: 5000 INJECTION INTRAVENOUS; SUBCUTANEOUS at 07:21

## 2023-04-27 RX ADMIN — Medication 200 MILLIGRAM(S): at 05:12

## 2023-04-27 RX ADMIN — PIPERACILLIN AND TAZOBACTAM 25 GRAM(S): 4; .5 INJECTION, POWDER, LYOPHILIZED, FOR SOLUTION INTRAVENOUS at 05:11

## 2023-04-27 RX ADMIN — PIPERACILLIN AND TAZOBACTAM 25 GRAM(S): 4; .5 INJECTION, POWDER, LYOPHILIZED, FOR SOLUTION INTRAVENOUS at 18:14

## 2023-04-27 RX ADMIN — Medication 50 MILLIEQUIVALENT(S): at 16:13

## 2023-04-27 RX ADMIN — HEPARIN SODIUM 5000 UNIT(S): 5000 INJECTION INTRAVENOUS; SUBCUTANEOUS at 21:54

## 2023-04-27 RX ADMIN — SODIUM CHLORIDE 100 MILLILITER(S): 9 INJECTION, SOLUTION INTRAVENOUS at 16:14

## 2023-04-27 RX ADMIN — HEPARIN SODIUM 600 UNIT(S)/HR: 5000 INJECTION INTRAVENOUS; SUBCUTANEOUS at 09:50

## 2023-04-27 RX ADMIN — PANTOPRAZOLE SODIUM 40 MILLIGRAM(S): 20 TABLET, DELAYED RELEASE ORAL at 05:31

## 2023-04-27 RX ADMIN — Medication 50 MILLIEQUIVALENT(S): at 13:07

## 2023-04-27 RX ADMIN — Medication 1 TABLET(S): at 16:13

## 2023-04-27 RX ADMIN — Medication 200 MILLIGRAM(S): at 18:14

## 2023-04-27 NOTE — CHART NOTE - NSCHARTNOTEFT_GEN_A_CORE
Patient seen and examined at bedside.  Used  ID# [id number], patient with limited participation in English or Comoran and speech difficult to understand.  Patient from [facility].  Lacks ability to make medical decisions at this time.  No known surrogate.  Has MOLST from 2021 indicating DNR/I.  Consulted Ethics for help in establishing if patient is under auspices of [facility].  Palliative consult to follow.

## 2023-04-27 NOTE — CONSULT NOTE ADULT - SUBJECTIVE AND OBJECTIVE BOX
FULL CONSULT TO FOLLOW    Gabriela Smith D.Min., RN-BSN, [facility]  Medical Ethics  [phone]     CLINICAL SUMMARY:      73 year old male with respiratory distress from long term care facility. Past medical history of hypertension, chronic obstructive pulmonary disease, congestive heart failure, seizure disorder, dementia, depression.     In ED patient found to be febrile (102.3), hypotensive (73/51) Tachycardic at 134, 100% oxygen saturation on NRB. WBC at 15.59, NA+ at 169. Cl at 142, BUN/Creatinine at 64/3.4, K+ 2.6, Lactate, blood at 7.0, UA + , chest xray with right sided infiltrate.  Diagnosed with sepsis due  to HCAP.   Current labs: WBC 12.75m Na+ 156, K+ 3/1, chloride, 132, BUN/Create 69/3.42 lactate at 2/6  Palliative medicine consulted as well as nephrology. Patient pending renal ultrasound.     Patient unable to participate in exams, or questions.   In his packet from the nursing home, a copy of a MOLST from 7/6/2021 reflects DNR/DNI, with physician signatures.     Ethics consult requested due to questions regarding the MOLST that accompanied the patient from the NH as well as questions regarding possible I/DD vs. dementia.     Prognosis Estimate (survival in hrs, days, wks, mos, yrs): unstable  Patient Decision-Making Capacity: 0Has 1 no capacity  Patient Aware of:  Diagnosis:  0 yes  No  1    Prognosis:   yes 0 No 1      Name of medical decision-maker should patient lack capacity (relationship):  unknown  Other Decision-Maker (i.e., HCA or Surrogate) Aware of:  Diagnosis: 0Yes 0      Prognosis:  0 Yes   0  Other Stake-Holders:    Evidence of Patient’s Preference of Life-Sustaining Treatment (Written or Oral) MOLST   Resuscitation status:   DNR: 1 0No      DNI: 1  0No        DISCUSSIONS: [id number] met with patient at bedside. Patient lethargic and unable to engage in conversation and answer simple questions.     [id number] spoke with assistant director of nursing at nursing [place] who deferred to Social Work and a message was left.     1440 left message for [facility].    4/28/2023- 0900 spoke with [facility], patient is not listed under [facility].

## 2023-04-27 NOTE — PROVIDER CONTACT NOTE (CRITICAL VALUE NOTIFICATION) - ACTION/TREATMENT ORDERED:
Treatment as per orders
Following heparin monogram, as per monogram will stopped for 60min will be reduce by 1 unit/hr
See orders
See orders.
replace potassium

## 2023-04-28 DIAGNOSIS — Z51.5 ENCOUNTER FOR PALLIATIVE CARE: ICD-10-CM

## 2023-04-28 DIAGNOSIS — N17.9 ACUTE KIDNEY FAILURE, UNSPECIFIED: ICD-10-CM

## 2023-04-28 DIAGNOSIS — R53.2 FUNCTIONAL QUADRIPLEGIA: ICD-10-CM

## 2023-04-28 DIAGNOSIS — E43 UNSPECIFIED SEVERE PROTEIN-CALORIE MALNUTRITION: ICD-10-CM

## 2023-04-28 DIAGNOSIS — A41.9 SEPSIS, UNSPECIFIED ORGANISM: ICD-10-CM

## 2023-04-28 DIAGNOSIS — F03.90 UNSPECIFIED DEMENTIA WITHOUT BEHAVIORAL DISTURBANCE: ICD-10-CM

## 2023-04-28 LAB
ANION GAP SERPL CALC-SCNC: 5 MMOL/L — SIGNIFICANT CHANGE UP (ref 5–17)
BUN SERPL-MCNC: 55 MG/DL — HIGH (ref 7–23)
CALCIUM SERPL-MCNC: 8.3 MG/DL — LOW (ref 8.5–10.1)
CHLORIDE SERPL-SCNC: 124 MMOL/L — HIGH (ref 96–108)
CO2 SERPL-SCNC: 21 MMOL/L — LOW (ref 22–31)
CREAT SERPL-MCNC: 3.17 MG/DL — HIGH (ref 0.5–1.3)
EGFR: 20 ML/MIN/1.73M2 — LOW
GLUCOSE SERPL-MCNC: 122 MG/DL — HIGH (ref 70–99)
HCT VFR BLD CALC: 41.3 % — SIGNIFICANT CHANGE UP (ref 39–50)
HGB BLD-MCNC: 13.1 G/DL — SIGNIFICANT CHANGE UP (ref 13–17)
MCHC RBC-ENTMCNC: 31.4 PG — SIGNIFICANT CHANGE UP (ref 27–34)
MCHC RBC-ENTMCNC: 31.7 G/DL — LOW (ref 32–36)
MCV RBC AUTO: 99 FL — SIGNIFICANT CHANGE UP (ref 80–100)
NRBC # BLD: 0 /100 WBCS — SIGNIFICANT CHANGE UP (ref 0–0)
PLATELET # BLD AUTO: 60 K/UL — LOW (ref 150–400)
POTASSIUM SERPL-MCNC: 3.7 MMOL/L — SIGNIFICANT CHANGE UP (ref 3.5–5.3)
POTASSIUM SERPL-SCNC: 3.7 MMOL/L — SIGNIFICANT CHANGE UP (ref 3.5–5.3)
RBC # BLD: 4.17 M/UL — LOW (ref 4.2–5.8)
RBC # FLD: 13.8 % — SIGNIFICANT CHANGE UP (ref 10.3–14.5)
SODIUM SERPL-SCNC: 150 MMOL/L — HIGH (ref 135–145)
WBC # BLD: 14.32 K/UL — HIGH (ref 3.8–10.5)
WBC # FLD AUTO: 14.32 K/UL — HIGH (ref 3.8–10.5)

## 2023-04-28 PROCEDURE — 99233 SBSQ HOSP IP/OBS HIGH 50: CPT

## 2023-04-28 PROCEDURE — 76775 US EXAM ABDO BACK WALL LIM: CPT | Mod: 26

## 2023-04-28 PROCEDURE — 99223 1ST HOSP IP/OBS HIGH 75: CPT

## 2023-04-28 RX ADMIN — Medication 200 MILLIGRAM(S): at 05:31

## 2023-04-28 RX ADMIN — SODIUM CHLORIDE 100 MILLILITER(S): 9 INJECTION, SOLUTION INTRAVENOUS at 21:36

## 2023-04-28 RX ADMIN — Medication 200 MILLIGRAM(S): at 17:56

## 2023-04-28 RX ADMIN — HEPARIN SODIUM 5000 UNIT(S): 5000 INJECTION INTRAVENOUS; SUBCUTANEOUS at 05:34

## 2023-04-28 RX ADMIN — PIPERACILLIN AND TAZOBACTAM 25 GRAM(S): 4; .5 INJECTION, POWDER, LYOPHILIZED, FOR SOLUTION INTRAVENOUS at 05:32

## 2023-04-28 RX ADMIN — SODIUM CHLORIDE 100 MILLILITER(S): 9 INJECTION, SOLUTION INTRAVENOUS at 11:48

## 2023-04-28 RX ADMIN — PANTOPRAZOLE SODIUM 40 MILLIGRAM(S): 20 TABLET, DELAYED RELEASE ORAL at 05:32

## 2023-04-28 RX ADMIN — HEPARIN SODIUM 5000 UNIT(S): 5000 INJECTION INTRAVENOUS; SUBCUTANEOUS at 21:31

## 2023-04-28 RX ADMIN — HEPARIN SODIUM 5000 UNIT(S): 5000 INJECTION INTRAVENOUS; SUBCUTANEOUS at 14:28

## 2023-04-28 RX ADMIN — SODIUM CHLORIDE 100 MILLILITER(S): 9 INJECTION, SOLUTION INTRAVENOUS at 02:29

## 2023-04-28 RX ADMIN — Medication 1 TABLET(S): at 11:48

## 2023-04-28 NOTE — DIETITIAN INITIAL EVALUATION ADULT - PERTINENT LABORATORY DATA
04-28    150<H>  |  124<H>  |  55<H>  ----------------------------<  122<H>  3.7   |  21<L>  |  3.17<H>    Ca    8.3<L>      28 Apr 2023 10:15

## 2023-04-28 NOTE — CONSULT NOTE ADULT - PROBLEM SELECTOR RECOMMENDATION 6
Patient presented from [facility] with DNR/I from 2021.  Patient is not under the auspices of [facility] or [facility].  Patient lacks decision making capacity.  Has no known surrogates.  Should there be a request for hospice or to amend the MOLST, would have to follow process as outlined by Medical Ethics.  Palliative will follow.      Discussed with SIMRAN Smith RN, Ethics and Dr. Johnson

## 2023-04-28 NOTE — CONSULT NOTE ADULT - REASON FOR ADMISSION
Severe Sepsis, Hypoxic respiratory failure

## 2023-04-28 NOTE — CONSULT NOTE ADULT - PROBLEM SELECTOR RECOMMENDATION 3
minimally verbal, dependent for care, likely hospice eligible  supportive care, promote sleep/wake cycles

## 2023-04-28 NOTE — CONSULT NOTE ADULT - CONSULT REASON
73 year old male without capacity and from a nursing home. Ethics assistance to determine if patient is  [facility] or [facility] status.
TONNY  Hypernatremia
GOC

## 2023-04-28 NOTE — DIETITIAN NUTRITION RISK NOTIFICATION - PHYSICAL ASSESSMENT FAT OVERLYING RIBCAGE
EXAMINATION TYPE: XR ankle complete RT

 

DATE OF EXAM: 7/10/2021

 

COMPARISON: NONE

 

HISTORY: Right ankle pain and swelling

 

TECHNIQUE: Frontal lateral and oblique right ankle radiographs

 

FINDINGS AND IMPRESSION:

Tiny density dorsal surface seen on the lateral radiograph at the level of the tarsometatarsal joints
, could represent soft tissue calcification versus foreign body. Clinical correlation recommended.

 

Soft tissue swelling over the lateral malleolus.

 

Normal ankle mortise and tibiofibular syndesmosis.

 

No acute fracture or dislocation seen.

 

No significant joint effusion.
severe

## 2023-04-28 NOTE — DIETITIAN NUTRITION RISK NOTIFICATION - TREATMENT: THE FOLLOWING DIET HAS BEEN RECOMMENDED
Diet, Minced and Moist:   Supplement Feeding Modality:  Oral  Ensure Plus High Protein Cans or Servings Per Day:  1       Frequency:  Two Times a day (04-28-23 @ 13:44) [Pending Verification By Attending]  Diet, Minced and Moist (04-25-23 @ 20:44) [Active]

## 2023-04-28 NOTE — PROVIDER CONTACT NOTE (CRITICAL VALUE NOTIFICATION) - TEST AND RESULT REPORTED:
sodium 164, potassium 2.9, PTT>200
CALCIUM IONIZED FROM 4/26/2023 CAME BACK LESS THAN 3.0
Potassium 2.6 Calcium 5.9 Lactate 6.1
Lactate 7.0
sodium 165
.4

## 2023-04-28 NOTE — CONSULT NOTE ADULT - SUBJECTIVE AND OBJECTIVE BOX
HPI:      72 y/o M w/ PMHx of HTN, COPD, Dementia, Seizure disorder, CHF, Depression, BPH sent in from NH for respiratory distress. Pt awake, confused, kept repeating "I want to go home" unable to obtain further hx due to MS Per EMS report, RN at [facility] found pt around 1pm in respiratory distress SPO2 65 on RA, pt also noted to have a fever.     ED Course  Initial itals BP 73/51, , RR 50 Tmax 102.3 SPO2 94% on 100% NRB  Labs WBC 15.59 19% bands Na 169, Cl 142, BUN/Cr 64/3.42 CXR pre-england R sided infiltrate, UA +ve bacteriuria  Pt given 1.6 L bolus and Zosyn (25 Apr 2023 18:16)    PERTINENT PM/SXH:   History of cognitive deficit    COPD, moderate    Chronic CHF      S/p tibial fracture      FAMILY HISTORY:  No pertinent family history in first degree relatives      ITEMS NOT CHECKED ARE NOT PRESENT    SOCIAL HISTORY:   Significant other/partner:  [ ]  Children:  [ ]  Samaritan/Spirituality:  Substance hx:  [ ]   Tobacco hx:  [ ]   Alcohol hx: [ ]   Home Opioid hx:  [ ] I-Stop Reference No: Reference #: [id number]  Living Situation: [ ]Home  [ x]Long term care  [ ]Rehab [ ]Other    ADVANCE DIRECTIVES:    DNR  MOLST  [x ]  Living Will  [ ]   DECISION MAKER(s):  [ ] Health Care Proxy(s)  [ ] Surrogate(s)  [ ] Guardian           Name(s): Phone Number(s):    BASELINE (I)ADL(s) (prior to admission):  [place]: [ ]Total  [ ] Moderate [x ]Dependent    Allergies    No Known Allergies    Intolerances    MEDICATIONS  (STANDING):  dextrose 5%. 1000 milliLiter(s) (100 mL/Hr) IV Continuous <Continuous>  heparin   Injectable 5000 Unit(s) SubCutaneous every 8 hours  multivitamin 1 Tablet(s) Oral daily  pantoprazole    Tablet 40 milliGRAM(s) Oral before breakfast  phenytoin   Capsule 200 milliGRAM(s) Oral two times a day    MEDICATIONS  (PRN):  acetaminophen     Tablet .. 650 milliGRAM(s) Oral every 6 hours PRN Temp greater or equal to 38C (100.4F), Mild Pain (1 - 3)  aluminum hydroxide/magnesium hydroxide/simethicone Suspension 30 milliLiter(s) Oral every 4 hours PRN Dyspepsia  melatonin 3 milliGRAM(s) Oral at bedtime PRN Insomnia  ondansetron Injectable 4 milliGRAM(s) IV Push every 8 hours PRN Nausea and/or Vomiting    PRESENT SYMPTOMS: [x ]Unable to obtain due to poor mentation   Source if other than patient:  [ ]Family   [ ]Team     Pain: [ ] yes [ ] no  QOL impact -   Location -                    Aggravating factors -  Quality -  Radiation -  Timing-  Severity (0-10 scale):  Minimal acceptable level (0-10 scale):     PAIN AD Score: 1    [email or web address] (press ctrl +  left click to view)    Dyspnea:                           [ ]Mild [ ]Moderate [ ]Severe  Anxiety:                             [ ]Mild [ ]Moderate [ ]Severe  Fatigue:                             [ ]Mild [ ]Moderate [ ]Severe  Nausea:                             [ ]Mild [ ]Moderate [ ]Severe  Loss of appetite:              [ ]Mild [ ]Moderate [ ]Severe  Constipation:                    [ ]Mild [ ]Moderate [ ]Severe    Other Symptoms:  [ ]All other review of systems negative     Karnofsky Performance Score/Palliative Performance Status Version 2:       30  %    http://npcrc.org/files/news/palliative_performance_scale_ppsv2.pdf  PHYSICAL EXAM:  Vital Signs Last 24 Hrs  T(C): 36.6 (28 Apr 2023 10:43), Max: 37 (27 Apr 2023 23:48)  T(F): 97.8 (28 Apr 2023 10:43), Max: 98.6 (27 Apr 2023 23:48)  HR: 88 (28 Apr 2023 10:43) (85 - 93)  BP: 103/69 (28 Apr 2023 10:43) (97/58 - 111/66)  BP(mean): --  RR: 18 (28 Apr 2023 10:43) (18 - 19)  SpO2: 95% (28 Apr 2023 10:43) (94% - 96%)    Parameters below as of 28 Apr 2023 10:43  Patient On (Oxygen Delivery Method): nasal cannula     I&O's Summary    27 Apr 2023 07:01  -  28 Apr 2023 07:00  --------------------------------------------------------  IN: 0 mL / OUT: 200 mL / NET: -200 mL    28 Apr 2023 07:01  -  28 Apr 2023 16:09  --------------------------------------------------------  IN: 60 mL / OUT: 0 mL / NET: 60 mL    GENERAL:  [x ]Alert  [ ]Oriented x   [ ]Lethargic  [ ]Cachexia  [ ]Unarousable  [ x]Verbal minimally and speech difficult to understand   [ ]Non-Verbal  Behavioral:   [ ] Anxiety  [ ] Delirium [ ] Agitation [ ] Other  HEENT:  [ ]Normal   [ ]Dry mouth   [ ]ET Tube/Trach  [ ]Oral lesions  PULMONARY:   [ ]Clear [ ]Tachypnea  [ ]Audible excessive secretions   [ ]Rhonchi        [ ]Right [ ]Left [ ]Bilateral  [ ]Crackles        [ ]Right [ ]Left [ ]Bilateral  [ ]Wheezing     [ ]Right [ ]Left [ ]Bilateral  diminished breath sounds bilaterally  CARDIOVASCULAR:    [ ]Regular [ ]Irregular [ ]Tachy  [ ]Keegan [ ]Murmur [ ]Other  GASTROINTESTINAL:  [ x]Soft  [ ]Distended   [ ]+BS  [x ]Non tender [ ]Tender  [ ]PEG [ ]OGT/ NGT  Last BM: 4/28/23 GENITOURINARY:  [ ]Normal [ x] Incontinent   [ ]Oliguria/Anuria   [ ]Muñoz  MUSCULOSKELETAL:   [ ]Normal   [ ]Weakness  [ x]Bed/Wheelchair bound [ ]Edema  NEUROLOGIC:   [ ]No focal deficits  [ x] Cognitive impairment  [ ] Dysphagia [ ]Dysarthria [ ] Paresis [ ]Other   SKIN:   [ ]Normal   [ ]Pressure ulcer(s)  [ ]Rash    CRITICAL CARE:  [ ] Shock Present  [ ]Septic [ ]Cardiogenic [ ]Neurologic [ ]Hypovolemic  [ ]  Vasopressors [ ]  Inotropes   [ ] Respiratory failure present [ ] mechanical ventilation [ ] non-invasive ventilatory support [ ] High flow  [ ] Acute  [ ] Chronic [ ] Hypoxic  [ ] Hypercarbic [ ] Other  [ ] Other organ failure     LABS:                        13.1   14.32 )-----------( 60       ( 28 Apr 2023 10:15 )             41.3   04-28    150<H>  |  124<H>  |  55<H>  ----------------------------<  122<H>  3.7   |  21<L>  |  3.17<H>    Ca    8.3<L>      28 Apr 2023 10:15    PTT - ( 27 Apr 2023 06:37 )  PTT:143.4 sec    Culture - Urine (04.25.23 @ 16:10)    Specimen Source: Clean Catch Clean Catch (Midstream)   Culture Results:   No growth    Culture - Blood (04.25.23 @ 15:35)    Specimen Source: .Blood Blood-Peripheral   Culture Results:   No growth to date.    D-Dimer Assay, Quantitative (04.25.23 @ 15:35)    D-Dimer Assay, Quantitative: 23543: D-Dimer result less than 230 ng/mL DDU correlates with the absence of  thrombosis in a patient with a low and moderate       pre-test probability of thrombosis.  1 DDU is approximately equal to  2 ng/mL FEU (previous units). ng/mL DDU    RADIOLOGY & ADDITIONAL STUDIES:   < from: US Renal (04.28.23 @ 09:34) >  IMPRESSION:  No hydronephrosis bilaterally.  Mild bladder wall thickening. Correlate for cystitis.  --- End of Report ---  < end of copied text >    < from: NM Pulmonary Ventilation/Perfusion Scan (04.26.23 @ 16:00) >  IMPRESSION:  Low probability of pulmonary embolus.  --- End of Report ---  < end of copied text >    < from: Xray Chest 1 View- PORTABLE-Urgent (Xray Chest 1 View- PORTABLE-Urgent .) (04.25.23 @ 17:03) >  IMPRESSION: Clear lungs.  --- End of Report ---  < end of copied text >    PROTEIN CALORIE MALNUTRITION PRESENT: [ ] Yes [ ] No      [x ] PPSV2 < or = to 30% [ ] significant weight loss  [x ] poor nutritional intake [ ] catabolic state [ ] anasarca     Artificial Nutrition [ ]     REFERRALS:   [ ]Chaplaincy  [ ] Hospice  [ ]Child Life  [ ]Social Work  [ ]Case management [ ]Holistic Therapy     Goals of Care Document:   Care Coordination Assessment 201 [C. Provider] (04-26-23 @ 15:44)

## 2023-04-28 NOTE — CONSULT NOTE ADULT - PROBLEM SELECTOR RECOMMENDATION 4
Clinical evidence indicates that the patient has Severe protein calorie malnutrition/ 3rd degree    In context of  Chronic Illness (>1 month)    Energy/Food intake <50% of estimated energy requirement >5 days  Body Fat loss: Severe   (Cachexia, temporal wasting, contracted, muscle atrophy)   Strength: weakened severe (bedbound)    Recommend:   encourage po and assistance with feeds, HOBE and oral hygiene

## 2023-04-28 NOTE — DIETITIAN INITIAL EVALUATION ADULT - OTHER INFO
Unable to interview pt due to cognitive impairment; garbled speech. Per medical record pt resides at LTC facility; no capacity to make medical decisions; no family involvement per information facility provided.  Both palliative care & Ethics are involved c pt care.  No reports of any N/V/C/D; diet at SNF is no added salt, ground consistency c thin liquids.  Pt DNR/I status per MOLST form.

## 2023-04-28 NOTE — DIETITIAN INITIAL EVALUATION ADULT - NUTRITIONGOAL OUTCOME1
Provide nutrition/hydration as medically appropriate, as tolerated, & within pt representative's wishes for tx

## 2023-04-28 NOTE — CONSULT NOTE ADULT - ASSESSMENT
BIOETHICAL ANALYSIS: The central issue in this case pertains to the practitioner’s beneficence and nonmaleficence.   In this case, clinicians are concerned with the risks involved to sustain life. Beneficence calls on clinicians to honor and preserve a patient’s sense of dignity and personhood, to promote the best possible health or quality of living with aggressive interventions when these help prolong life with “good quality,” with comfort care when cure and remission are not possible and the aim is to achieve the best “quality of dying.”(i) The same is true for the clinician’s duty to nonmaleficence, avoiding medical interventions whose risk and burden exceeds their benefit.     In this case , the patient has a poor prognosis and is currently unable to make medical decisions because he has lost the ability to do so. There is no known family or other surrogate decision makers at this time.     In this case, the patient is acutely ill with electrolyte derangement, kidney injury and advanced dementia, and a questionable prognosis. Decisions concerning treatment are complex and must be taken into consideration given the higher rates of mortality and morbidity associated in this specific case. Physicians are under no legal or ethical obligation to offer treatments that would provide no medically indicated benefit or impose unnecessary risk or burden to the patient. The benefits of interventions must be weighed against the harm they may cause when they no longer improve prognosis but introduce suffering and risk.     Regarding goals of care in an unbefriended patient, especially one where sepsis and impact on renal function are in question, [facility] law aids the practitioner when making treatment decisions for patients that have lost their ability to make medical decisions and are unbefriended. For a decision regarding withholding and withdrawing life-sustaining treatment, the practitioner will make this determination taking into account the patient’s wishes, or if they are not known, in the patient’s best interest.(ii) A decision to withhold or withdraw life-sustaining treatment must also meet the following criteria.(iii) To a reasonable degree of medical certainty:     • life-sustaining treatment offers the patient no medical benefit because the patient will die imminently, even if the treatment is provided; and   • the provision of life-sustaining treatment would violate accepted medical standards.     This decision must be made by the attending practitioner with the independent concurrence of another physician, nurse practitioner or physician assistant.(iv)     Decisions regarding hospice care for unbefriended patients can be included in decisions about withholding and withdrawing life-sustaining treatments, including a DNR order.(v-vi) [place] law again provides the criteria to secure a decision regarding hospice care for such patient. As this patient already has a MOLST indicating DNR/DNI, this is to remain in place.     The attending practitioner can make decisions regarding hospice taking into account the patient’s wishes, or if they are not known, in the patient’s best interest; with the concurring opinion by another practitioner; and approval by an ethics review committee.(vii) which if needed, Ethics Consultation will convene.       RECOMMENDATION: For unbefriended patients that lack decision making capacity [place] state law provides the criteria for practitioners to withhold or withdraw life-sustaining treatments, make decisions regarding hospice care. As this patient already has a MOLST from his long term care facility, it would be appropriate to continue to maintain this advance directive.     Ethics can assist in the process as delineated above if palliative medicine/hospice is in consideration as his hospital course progresses.     Thank you for including the Ethics Consultation Service. Ethics commends the team for their virtue in the care and support for Mr. Roth. Ethics will remain available to aid in these processes and for any discussions and decision points that may occur.     Thank you for this challenging consult.     Gabriela Smith D.Min., BSN-RN, [facility]  Medical Ethics  [phone]    More than 50% of the time of this consultation was spent in coordination of Care of Patient    REFERENCES:   i	(i) Ruby TL & Abel JF. Principles of Biomedical Ethics. [place]: [employer]; 2019.   ii	(ii) [place] §§ 2994-d.4, 2994-g.2 (2020).   iii	(iii) [place] § 2994-g.5.   iv	(iv) [place] 2994-g.5(b).   v	(v) [place] 2994-g.5-a.   vi	(vi) See [place] [employer]’s Family Health Care Decisions Act (FHCDA) Resource Center, Frequently Asked Questions About the Family Health Care Decisions Act. Available at: https://nysba.org/fhcda-resource-center/ [last accessed April 8, 2021].   vii	(vii) [place] § 2994-g.5-a.   viii	(viii) [facility] § 2997-d (2020). See also, Palliative Care Information Act, [facility] § 2997-c (2020).       CASE REPORT WRITTEN BY SIMRAN HARRIS, BSN-RN, [facility]             
73 year old male PMH of HF, COPD, seizure disorder, depression, dementia, and BPH presented to the ED from NH for respiratory distress.  Patient initially hypoxic now on NC.  Palliative care consulted for GOC.

## 2023-04-28 NOTE — CONSULT NOTE ADULT - PROBLEM SELECTOR RECOMMENDATION 2
creatinine elevated, but stable, nephrology following, renal US-no hydronephrosis  likely component of dehydration, patient hypernatremic with serum sodium of 169, has trended down to 150, on D5% IVF  avoid nephrotoxic agents

## 2023-04-28 NOTE — CONSULT NOTE ADULT - PROBLEM SELECTOR RECOMMENDATION 9
hypoxic was on RA on arrival, on NC with improvement, lactate elevated to 7, trending down, s/p IVF boluses, blood and urine cultures with no growth   d-dimer elevated to [address], NM perfusion with low probability of PE, on heparin gtt

## 2023-04-29 LAB
ANION GAP SERPL CALC-SCNC: 5 MMOL/L — SIGNIFICANT CHANGE UP (ref 5–17)
BUN SERPL-MCNC: 43 MG/DL — HIGH (ref 7–23)
CALCIUM SERPL-MCNC: 8.4 MG/DL — LOW (ref 8.5–10.1)
CHLORIDE SERPL-SCNC: 123 MMOL/L — HIGH (ref 96–108)
CO2 SERPL-SCNC: 21 MMOL/L — LOW (ref 22–31)
CREAT SERPL-MCNC: 2.6 MG/DL — HIGH (ref 0.5–1.3)
EGFR: 25 ML/MIN/1.73M2 — LOW
GLUCOSE SERPL-MCNC: 100 MG/DL — HIGH (ref 70–99)
HCT VFR BLD CALC: 36.2 % — LOW (ref 39–50)
HGB BLD-MCNC: 11.1 G/DL — LOW (ref 13–17)
MCHC RBC-ENTMCNC: 30.7 G/DL — LOW (ref 32–36)
MCHC RBC-ENTMCNC: 31.7 PG — SIGNIFICANT CHANGE UP (ref 27–34)
MCV RBC AUTO: 103.4 FL — HIGH (ref 80–100)
NRBC # BLD: 0 /100 WBCS — SIGNIFICANT CHANGE UP (ref 0–0)
PLATELET # BLD AUTO: 64 K/UL — LOW (ref 150–400)
POTASSIUM SERPL-MCNC: 3.8 MMOL/L — SIGNIFICANT CHANGE UP (ref 3.5–5.3)
POTASSIUM SERPL-SCNC: 3.8 MMOL/L — SIGNIFICANT CHANGE UP (ref 3.5–5.3)
RBC # BLD: 3.5 M/UL — LOW (ref 4.2–5.8)
RBC # FLD: 14.1 % — SIGNIFICANT CHANGE UP (ref 10.3–14.5)
SODIUM SERPL-SCNC: 149 MMOL/L — HIGH (ref 135–145)
WBC # BLD: 9.76 K/UL — SIGNIFICANT CHANGE UP (ref 3.8–10.5)
WBC # FLD AUTO: 9.76 K/UL — SIGNIFICANT CHANGE UP (ref 3.8–10.5)

## 2023-04-29 PROCEDURE — 99233 SBSQ HOSP IP/OBS HIGH 50: CPT

## 2023-04-29 RX ADMIN — Medication 1 TABLET(S): at 12:13

## 2023-04-29 RX ADMIN — HEPARIN SODIUM 5000 UNIT(S): 5000 INJECTION INTRAVENOUS; SUBCUTANEOUS at 06:08

## 2023-04-29 RX ADMIN — SODIUM CHLORIDE 100 MILLILITER(S): 9 INJECTION, SOLUTION INTRAVENOUS at 10:35

## 2023-04-29 RX ADMIN — HEPARIN SODIUM 5000 UNIT(S): 5000 INJECTION INTRAVENOUS; SUBCUTANEOUS at 14:20

## 2023-04-29 RX ADMIN — Medication 200 MILLIGRAM(S): at 18:49

## 2023-04-29 RX ADMIN — PANTOPRAZOLE SODIUM 40 MILLIGRAM(S): 20 TABLET, DELAYED RELEASE ORAL at 06:08

## 2023-04-29 RX ADMIN — HEPARIN SODIUM 5000 UNIT(S): 5000 INJECTION INTRAVENOUS; SUBCUTANEOUS at 21:52

## 2023-04-29 RX ADMIN — SODIUM CHLORIDE 100 MILLILITER(S): 9 INJECTION, SOLUTION INTRAVENOUS at 12:12

## 2023-04-29 RX ADMIN — Medication 200 MILLIGRAM(S): at 06:08

## 2023-04-30 LAB
ANION GAP SERPL CALC-SCNC: 3 MMOL/L — LOW (ref 5–17)
BUN SERPL-MCNC: 38 MG/DL — HIGH (ref 7–23)
CALCIUM SERPL-MCNC: 8.7 MG/DL — SIGNIFICANT CHANGE UP (ref 8.5–10.1)
CHLORIDE SERPL-SCNC: 121 MMOL/L — HIGH (ref 96–108)
CO2 SERPL-SCNC: 20 MMOL/L — LOW (ref 22–31)
CREAT SERPL-MCNC: 2.65 MG/DL — HIGH (ref 0.5–1.3)
EGFR: 25 ML/MIN/1.73M2 — LOW
GLUCOSE SERPL-MCNC: 115 MG/DL — HIGH (ref 70–99)
HCT VFR BLD CALC: 43.5 % — SIGNIFICANT CHANGE UP (ref 39–50)
HGB BLD-MCNC: 14.2 G/DL — SIGNIFICANT CHANGE UP (ref 13–17)
MCHC RBC-ENTMCNC: 31.4 PG — SIGNIFICANT CHANGE UP (ref 27–34)
MCHC RBC-ENTMCNC: 32.6 G/DL — SIGNIFICANT CHANGE UP (ref 32–36)
MCV RBC AUTO: 96.2 FL — SIGNIFICANT CHANGE UP (ref 80–100)
NRBC # BLD: 0 /100 WBCS — SIGNIFICANT CHANGE UP (ref 0–0)
PLATELET # BLD AUTO: 89 K/UL — LOW (ref 150–400)
POTASSIUM SERPL-MCNC: 3.7 MMOL/L — SIGNIFICANT CHANGE UP (ref 3.5–5.3)
POTASSIUM SERPL-SCNC: 3.7 MMOL/L — SIGNIFICANT CHANGE UP (ref 3.5–5.3)
RBC # BLD: 4.52 M/UL — SIGNIFICANT CHANGE UP (ref 4.2–5.8)
RBC # FLD: 13.4 % — SIGNIFICANT CHANGE UP (ref 10.3–14.5)
SODIUM SERPL-SCNC: 144 MMOL/L — SIGNIFICANT CHANGE UP (ref 135–145)
WBC # BLD: 12.74 K/UL — HIGH (ref 3.8–10.5)
WBC # FLD AUTO: 12.74 K/UL — HIGH (ref 3.8–10.5)

## 2023-04-30 PROCEDURE — 36481 INSERTION OF CATHETER VEIN: CPT

## 2023-04-30 PROCEDURE — 99233 SBSQ HOSP IP/OBS HIGH 50: CPT

## 2023-04-30 RX ADMIN — SODIUM CHLORIDE 100 MILLILITER(S): 9 INJECTION, SOLUTION INTRAVENOUS at 22:40

## 2023-04-30 RX ADMIN — Medication 200 MILLIGRAM(S): at 19:00

## 2023-04-30 RX ADMIN — HEPARIN SODIUM 5000 UNIT(S): 5000 INJECTION INTRAVENOUS; SUBCUTANEOUS at 15:28

## 2023-04-30 RX ADMIN — HEPARIN SODIUM 5000 UNIT(S): 5000 INJECTION INTRAVENOUS; SUBCUTANEOUS at 22:39

## 2023-04-30 RX ADMIN — HEPARIN SODIUM 5000 UNIT(S): 5000 INJECTION INTRAVENOUS; SUBCUTANEOUS at 06:18

## 2023-04-30 RX ADMIN — Medication 200 MILLIGRAM(S): at 06:23

## 2023-04-30 RX ADMIN — Medication 1 TABLET(S): at 12:08

## 2023-04-30 RX ADMIN — SODIUM CHLORIDE 100 MILLILITER(S): 9 INJECTION, SOLUTION INTRAVENOUS at 18:37

## 2023-04-30 RX ADMIN — PANTOPRAZOLE SODIUM 40 MILLIGRAM(S): 20 TABLET, DELAYED RELEASE ORAL at 06:16

## 2023-04-30 NOTE — PROGRESS NOTE ADULT - ASSESSMENT
74 y/o M w/ PMHx of HTN, COPD, Dementia, Seizure disorder, CHF, Depression, BPH sent in from NH for respiratory distress, pt being admitted for Acute respiratory failure, severe sepsis due to HCAP    # Acute Hypoxic Respiratory failure  # severe sepsis likely poor PO intake   # severe dehydration (from [facility])  - CXR - clear lung  - Dimer 93921, lactate of 7 despite 2 L bolus  - d/c Heparin gtt. low probability of PE per VQ scan  - V/Q scan   - c/w Zosyn and Vanco in ED, cont Zosyn  - f/u blood cultures  - c/w supplemental oxygen  - nutritional eval  Functional quadriplegia- supportive care   # TONNY/ATN - BL renal function unknown. c/w IVF. avoid nephrotoxins, renal eval  # Hypernatremia-  D5. trend Na  Hypokalemia- replaced, monitor   Lactic acidosis- ivf, trend lactate  # Bacteriuria - c/w Zosyn. f/u urine cultures  # Seizure disorder - c/w Phenytoin, level 7.3  # DVT ppx - d/c heparin gtt for now  Pt DNR/DNI   
74 y/o M w/ PMHx of HTN, COPD, Dementia, Seizure disorder, CHF, Depression, BPH sent in from NH for respiratory distress, pt being admitted for Acute respiratory failure, severe sepsis due to HCAP    # Acute Hypoxic Respiratory failure  # severe sepsis likely poor PO intake   # severe dehydration (from [facility])  - CXR - clear lung  - Dimer 15181, lactate of 7 despite 2 L bolus  - d/c Heparin gtt. low probability of PE per VQ scan  - V/Q scan neg  - c/w Zosyn and Vanco in ED, off abx  - blood cultures neg  - c/w supplemental oxygen  - nutritional eval  Functional quadriplegia- supportive care   # TONNY/ATN - BL renal function unknown. c/w IVF. avoid nephrotoxins, renal eval  # Hypernatremia-  D5. trend Na  Hypokalemia- replaced, monitor   Lactic acidosis- ivf, trend lactate  Severe protein-calorie malnutrition.- nutritional suppl.  # Bacteriuria - d/c Zosyn. f/u urine cultures  # Seizure disorder - c/w Phenytoin, level 7.3  # DVT ppx - d/c heparin gtt for now  Pt DNR/DNI   
72 y/o M w/ PMHx of HTN, COPD, Dementia, Seizure disorder, CHF, Depression, BPH sent in from NH for respiratory distress, pt being admitted for Acute respiratory failure, severe sepsis due to HCAP    # Acute Hypoxic Respiratory failure  # severe sepsis likely poor PO intake   # severe dehydration (from [facility])  - CXR - clear lung  - Dimer 58743, lactate of 7 despite 2 L bolus  - d/c Heparin gtt. low probability of PE per VQ scan  - V/Q scan neg  - c/w Zosyn and Vanco in ED, off abx  - blood cultures neg  - c/w supplemental oxygen  - nutritional eval  Functional quadriplegia- supportive care   # TONNY/ATN - BL renal function unknown. c/w IVF. avoid nephrotoxins, renal eval  # Hypernatremia-  D5. trend Na  Hypokalemia- replaced, monitor   Lactic acidosis- ivf, trend lactate  Severe protein-calorie malnutrition.- nutritional suppl.  # Bacteriuria - d/c Zosyn. f/u urine cultures  # Seizure disorder - c/w Phenytoin, level 7.3  # DVT ppx - d/c heparin gtt for now  Pt DNR/DNI   
74 y/o M w/ PMHx of HTN, COPD, Dementia, Seizure disorder, CHF, Depression, BPH sent in from NH for respiratory distress, pt being admitted for Acute respiratory failure, severe sepsis due to HCAP    # Acute Hypoxic Respiratory failure  # severe sepsis likely poor PO intake   # severe dehydration (from [facility])  - CXR - clear lung  - Dimer 13883, lactate of 7 despite 2 L bolus  - treat for PE, place on Heparin gtt  - V/Q scan   - c/w Zosyn and Vanco in ED, cont Zosyn  - f/u blood cultures  - c/w supplemental oxygen  - nutritional eval  Functional quadriplegia- supportive care   # TONNY/ATN - BL renal function unknown. c/w IVF. avoid nephrotoxins, renal eval  # Hypernatremia- change to D5. trend Na  Hypokalemia- replaced, monitor   Lactic acidosis- ivf, trend lactate  # Bacteriuria - c/w Zosyn. f/u urine cultures  # Seizure disorder - c/w Phenytoin, level 7.3  # DVT ppx - on heparin gtt for now  Pt DNR/DNI   
72 y/o M w/ PMHx of HTN, COPD, Dementia, Seizure disorder, CHF, Depression, BPH sent in from NH for respiratory distress, pt being admitted for Acute respiratory failure, severe sepsis due to HCAP    # Acute Hypoxic Respiratory failure  # severe sepsis likely poor PO intake   # severe dehydration (from [facility])  - CXR - clear lung  - Dimer 69932, lactate of 7 despite 2 L bolus  - d/c Heparin gtt. low probability of PE per VQ scan  - V/Q scan neg  - c/w Zosyn and Vanco in ED, off abx  - blood cultures neg  - c/w supplemental oxygen  - nutritional eval  Functional quadriplegia- supportive care   # TONNY/ATN - BL renal function unknown. c/w IVF. avoid nephrotoxins, renal eval  # Hypernatremia-  D5. trend Na  Hypokalemia- replaced, monitor   Lactic acidosis- ivf, trend lactate  Severe protein-calorie malnutrition.- nutritional suppl.  # Bacteriuria - d/c Zosyn. f/u urine cultures  # Seizure disorder - c/w Phenytoin, level 7.3  # DVT ppx - d/c heparin gtt for now  Pt DNR/DNI

## 2023-04-30 NOTE — PROGRESS NOTE ADULT - NUTRITIONAL ASSESSMENT
This patient has been assessed with a concern for Malnutrition and has been determined to have a diagnosis/diagnoses of Severe protein-calorie malnutrition.    This patient is being managed with:   Diet Pureed-  Mildly Thick Liquids (MILDTHICKLIQS)  Supplement Feeding Modality:  Oral  Health Shake Cans or Servings Per Day:  1       Frequency:  Two Times a day  Entered: Apr 30 2023  9:43AM  
This patient has been assessed with a concern for Malnutrition and has been determined to have a diagnosis/diagnoses of Severe protein-calorie malnutrition.    This patient is being managed with:   Diet Minced and Moist-  Supplement Feeding Modality:  Oral  Ensure Plus High Protein Cans or Servings Per Day:  1       Frequency:  Two Times a day  Entered: Apr 28 2023  1:44PM  
This patient has been assessed with a concern for Malnutrition and has been determined to have a diagnosis/diagnoses of Severe protein-calorie malnutrition.    This patient is being managed with:   Diet Minced and Moist-  Supplement Feeding Modality:  Oral  Ensure Plus High Protein Cans or Servings Per Day:  1       Frequency:  Two Times a day  Entered: Apr 28 2023  1:44PM

## 2023-04-30 NOTE — PROGRESS NOTE ADULT - SUBJECTIVE AND OBJECTIVE BOX
NEPHROLOGY PROGRESS NOTE    CHIEF COMPLAINT:  TONNY/CKD    HPI:  Renal function w/o change.  Serum Na improved on D5W.     EXAM:  T(F): 97.6 (23 @ 11:24)  HR: 80 (23 @ 11:24)  BP: 100/64 (23 @ 11:24)  RR: 18 (23 @ 11:24)  SpO2: 94% (23 @ 11:24)    Conversant, in no apparent distress  Normal respiratory effort, lungs clear bilaterally  Heart RRR with no murmur, no peripheral edema         LABS                             13.0   12.75 )-----------( 58       ( 2023 06:37 )             41.2              156<H>  |  132<H>  |  69<H>  ----------------------------<  138<H>  3.1<L>   |  20<L>  |  3.42<H>    Ca    8.2<L>      2023 06:37  Mg     2.4         TPro  7.6  /  Alb  2.3<L>  /  TBili  1.2  /  DBili  x   /  AST  96<H>  /  ALT  44  /  AlkPhos  117      Urinalysis Basic - ( 2023 16:10 )  Color: Yellow / Appearance: Slightly Turbid / S.020 / pH: x  Gluc: x / Ketone: Negative  / Bili: Small / Urobili: 4 mg/dL   Blood: x / Protein: 30 mg/dL / Nitrite: Negative   Leuk Esterase: Trace / RBC: 3-5 /HPF / WBC 3-5   Sq Epi: x / Non Sq Epi: x / Bacteria: Many    V/Q scan shows low probability of PE      Assessment   TONNY CKD degree unclear; hypernatremia; PNA    Plan  Would continue IVF D5W  Supplement KCl  Renal ultrasound      
Patient is a 73y old  Male who presents with a chief complaint of Severe Sepsis, Hypoxic respiratory failure (28 Apr 2023 17:54)      OVERNIGHT EVENTS:  Patients seen and examined at bedside this morning. No acute events overnight.    REVIEW OF SYSTEMS: denies chest pain/SOB, diaphoresis, no F/C, cough, dizziness, headache, blurry vision, nausea, vomiting, abdominal pain. All others review of systems negative     MEDICATIONS  (STANDING):  dextrose 5%. 1000 milliLiter(s) (100 mL/Hr) IV Continuous <Continuous>  heparin   Injectable 5000 Unit(s) SubCutaneous every 8 hours  multivitamin 1 Tablet(s) Oral daily  pantoprazole    Tablet 40 milliGRAM(s) Oral before breakfast  phenytoin   Capsule 200 milliGRAM(s) Oral two times a day    MEDICATIONS  (PRN):  acetaminophen     Tablet .. 650 milliGRAM(s) Oral every 6 hours PRN Temp greater or equal to 38C (100.4F), Mild Pain (1 - 3)  aluminum hydroxide/magnesium hydroxide/simethicone Suspension 30 milliLiter(s) Oral every 4 hours PRN Dyspepsia  melatonin 3 milliGRAM(s) Oral at bedtime PRN Insomnia  ondansetron Injectable 4 milliGRAM(s) IV Push every 8 hours PRN Nausea and/or Vomiting      Allergies    No Known Allergies    Intolerances        T(F): 97.7 (04-29-23 @ 11:07), Max: 99.9 (04-28-23 @ 17:08)  HR: 92 (04-29-23 @ 11:07) (83 - 92)  BP: 105/60 (04-29-23 @ 12:16) (99/59 - 111/57)  RR: 18 (04-29-23 @ 11:07) (18 - 18)  SpO2: 97% (04-29-23 @ 11:07) (97% - 98%)  Wt(kg): --    PHYSICAL EXAM:  GENERAL: NAD  HEAD:  Atraumatic, Normocephalic  EYES: PERRLA, conjunctiva and sclera clear  ENMT: Moist mucous membranes  NECK: Supple, No JVD, Normal thyroid  NERVOUS SYSTEM:  Alert & Awake  CHEST/LUNG: Clear to percussion bilaterally;   HEART: Regular rate and rhythm;   ABDOMEN: Soft, Nontender, Nondistended; Bowel sounds present  EXTREMITIES:  no edema BL LE  SKIN: moist    LABS:                        11.1   9.76  )-----------( 64       ( 29 Apr 2023 07:10 )             36.2     04-29    149<H>  |  123<H>  |  43<H>  ----------------------------<  100<H>  3.8   |  21<L>  |  2.60<H>    Ca    8.4<L>      29 Apr 2023 11:45          Cultures;   CAPILLARY BLOOD GLUCOSE        Lipid panel:           RADIOLOGY & ADDITIONAL TESTS:    Imaging Personally Reviewed:  [x ] YES      Consultant(s) Notes Reviewed:  [x ] YES     Care Discussed with [x ] Consultants [X ] Patient [ ] Family  [x ]    [x ]  Other; RN
Patient is a 73y old  Male who presents with a chief complaint of Severe Sepsis, Hypoxic respiratory failure (29 Apr 2023 23:08)      OVERNIGHT EVENTS:  Patients seen and examined at bedside this morning. No acute events overnight.    REVIEW OF SYSTEMS: denies chest pain/SOB, diaphoresis, no F/C, cough, dizziness, headache, blurry vision, nausea, vomiting, abdominal pain. All others review of systems negative     MEDICATIONS  (STANDING):  dextrose 5%. 1000 milliLiter(s) (100 mL/Hr) IV Continuous <Continuous>  heparin   Injectable 5000 Unit(s) SubCutaneous every 8 hours  multivitamin 1 Tablet(s) Oral daily  pantoprazole    Tablet 40 milliGRAM(s) Oral before breakfast  phenytoin   Suspension 200 milliGRAM(s) Oral two times a day    MEDICATIONS  (PRN):  acetaminophen     Tablet .. 650 milliGRAM(s) Oral every 6 hours PRN Temp greater or equal to 38C (100.4F), Mild Pain (1 - 3)  aluminum hydroxide/magnesium hydroxide/simethicone Suspension 30 milliLiter(s) Oral every 4 hours PRN Dyspepsia  melatonin 3 milliGRAM(s) Oral at bedtime PRN Insomnia  ondansetron Injectable 4 milliGRAM(s) IV Push every 8 hours PRN Nausea and/or Vomiting      Allergies    No Known Allergies    Intolerances        T(F): 98.7 (04-30-23 @ 11:04), Max: 98.7 (04-30-23 @ 00:18)  HR: 99 (04-30-23 @ 11:04) (91 - 102)  BP: 99/67 (04-30-23 @ 11:04) (90/62 - 116/61)  RR: 18 (04-30-23 @ 11:04) (18 - 18)  SpO2: 93% (04-30-23 @ 12:11) (90% - 96%)  Wt(kg): --    PHYSICAL EXAM:  GENERAL: NAD  HEAD:  Atraumatic, Normocephalic  EYES: PERRLA, conjunctiva and sclera clear  ENMT: Moist mucous membranes  NECK: Supple, No JVD, Normal thyroid  NERVOUS SYSTEM:  Alert & Awake  CHEST/LUNG: Clear to percussion bilaterally;   HEART: Regular rate and rhythm;   ABDOMEN: Soft, Nontender, Nondistended; Bowel sounds present  EXTREMITIES:  no edema BL LE  SKIN: moist    LABS:                        14.2   12.74 )-----------( 89       ( 30 Apr 2023 05:59 )             43.5     04-30    144  |  121<H>  |  38<H>  ----------------------------<  115<H>  3.7   |  20<L>  |  2.65<H>    Ca    8.7      30 Apr 2023 05:59          Cultures;   CAPILLARY BLOOD GLUCOSE        Lipid panel:           RADIOLOGY & ADDITIONAL TESTS:    Imaging Personally Reviewed:  [x ] YES      Consultant(s) Notes Reviewed:  [x ] YES     Care Discussed with [x ] Consultants [X ] Patient [ ] Family  [x ]    [x ]  Other; RN
[facility] NEPHROLOGY SERVICES, [facility]  NEPHROLOGY AND HYPERTENSION  [address]  [address]  [place] [address]  [phone]    MD WILTON SOLORZANO, MD NIKO MACARIO, MD DES PRAJAPATI, MD JOY BARAJAS MD          Patient events noted    MEDICATIONS  (STANDING):  dextrose 5%. 1000 milliLiter(s) (100 mL/Hr) IV Continuous <Continuous>  heparin   Injectable 5000 Unit(s) SubCutaneous every 8 hours  multivitamin 1 Tablet(s) Oral daily  pantoprazole    Tablet 40 milliGRAM(s) Oral before breakfast  phenytoin   Suspension 200 milliGRAM(s) Oral two times a day    MEDICATIONS  (PRN):  acetaminophen     Tablet .. 650 milliGRAM(s) Oral every 6 hours PRN Temp greater or equal to 38C (100.4F), Mild Pain (1 - 3)  aluminum hydroxide/magnesium hydroxide/simethicone Suspension 30 milliLiter(s) Oral every 4 hours PRN Dyspepsia  melatonin 3 milliGRAM(s) Oral at bedtime PRN Insomnia  ondansetron Injectable 4 milliGRAM(s) IV Push every 8 hours PRN Nausea and/or Vomiting      04-28-23 @ 07:01  -  04-29-23 @ 07:00  --------------------------------------------------------  IN: 60 mL / OUT: 300 mL / NET: -240 mL    04-29-23 @ 07:01  -  04-29-23 @ 23:09  --------------------------------------------------------  IN: 1200 mL / OUT: 0 mL / NET: 1200 mL      PHYSICAL EXAM:      T(C): 36.3 (04-29-23 @ 17:02), Max: 37.5 (04-28-23 @ 23:55)  HR: 102 (04-29-23 @ 17:02) (83 - 102)  BP: 90/62 (04-29-23 @ 17:02) (90/62 - 107/61)  RR: 18 (04-29-23 @ 17:02) (18 - 18)  SpO2: 95% (04-29-23 @ 17:02) (95% - 97%)  Wt(kg): --  Lungs clear  Heart S1S2  Abd soft NT ND  Extremities:   tr edema                                    11.1   9.76  )-----------( 64       ( 29 Apr 2023 07:10 )             36.2     04-29    149<H>  |  123<H>  |  43<H>  ----------------------------<  100<H>  3.8   |  21<L>  |  2.60<H>    Ca    8.4<L>      29 Apr 2023 11:45          Creatinine Trend: 2.60<--, 3.17<--, 3.42<--, 3.58<--, 3.36<--, 2.35<--        Assessment   TONNY CKD degree unclear; hypernatremia; PNA    Plan  Would continue IVF D5W    Renal ultrasound      Bassam Tyson MD
[facility] NEPHROLOGY SERVICES, [facility]  NEPHROLOGY AND HYPERTENSION  [address]  [address]  [place] [address]  [phone]    MD WILTON SOLORZANO, MD NIKO MACARIO, MD DES PRAJAPATI, MD JOY BARAJAS MD          Patient events noted    MEDICATIONS  (STANDING):  dextrose 5%. 1000 milliLiter(s) (100 mL/Hr) IV Continuous <Continuous>  heparin   Injectable 5000 Unit(s) SubCutaneous every 8 hours  multivitamin 1 Tablet(s) Oral daily  pantoprazole    Tablet 40 milliGRAM(s) Oral before breakfast  phenytoin   Capsule 200 milliGRAM(s) Oral two times a day    MEDICATIONS  (PRN):  acetaminophen     Tablet .. 650 milliGRAM(s) Oral every 6 hours PRN Temp greater or equal to 38C (100.4F), Mild Pain (1 - 3)  aluminum hydroxide/magnesium hydroxide/simethicone Suspension 30 milliLiter(s) Oral every 4 hours PRN Dyspepsia  melatonin 3 milliGRAM(s) Oral at bedtime PRN Insomnia  ondansetron Injectable 4 milliGRAM(s) IV Push every 8 hours PRN Nausea and/or Vomiting      04-27-23 @ 07:01  -  04-28-23 @ 07:00  --------------------------------------------------------  IN: 0 mL / OUT: 200 mL / NET: -200 mL    04-28-23 @ 07:01  -  04-28-23 @ 17:55  --------------------------------------------------------  IN: 60 mL / OUT: 0 mL / NET: 60 mL      PHYSICAL EXAM:      T(C): 37.7 (04-28-23 @ 17:08), Max: 37.7 (04-28-23 @ 17:08)  HR: 91 (04-28-23 @ 17:08) (85 - 93)  BP: 111/57 (04-28-23 @ 17:08) (97/58 - 111/66)  RR: 18 (04-28-23 @ 17:08) (18 - 19)  SpO2: 98% (04-28-23 @ 17:08) (94% - 98%)  Wt(kg): --  Lungs clear  Heart S1S2  Abd soft NT ND  Extremities:   tr edema                                    13.1   14.32 )-----------( 60       ( 28 Apr 2023 10:15 )             41.3     04-28    150<H>  |  124<H>  |  55<H>  ----------------------------<  122<H>  3.7   |  21<L>  |  3.17<H>    Ca    8.3<L>      28 Apr 2023 10:15          Creatinine Trend: 3.17<--, 3.42<--, 3.58<--, 3.36<--, 2.35<--, 3.42<--          Assessment   TONNY CKD degree unclear; hypernatremia; PNA    Plan  Would continue IVF D5W    Renal ultrasound  Bassam Tyson MD
Patient is a 73y old  Male who presents with a chief complaint of ACUTE SEPSIS     (28 Apr 2023 13:25)      OVERNIGHT EVENTS:  Patients seen and examined at bedside this morning. No acute events overnight.    REVIEW OF SYSTEMS: denies chest pain/SOB, diaphoresis, no F/C, cough, dizziness, headache, blurry vision, nausea, vomiting, abdominal pain. All others review of systems negative     MEDICATIONS  (STANDING):  dextrose 5%. 1000 milliLiter(s) (100 mL/Hr) IV Continuous <Continuous>  heparin   Injectable 5000 Unit(s) SubCutaneous every 8 hours  multivitamin 1 Tablet(s) Oral daily  pantoprazole    Tablet 40 milliGRAM(s) Oral before breakfast  phenytoin   Capsule 200 milliGRAM(s) Oral two times a day    MEDICATIONS  (PRN):  acetaminophen     Tablet .. 650 milliGRAM(s) Oral every 6 hours PRN Temp greater or equal to 38C (100.4F), Mild Pain (1 - 3)  aluminum hydroxide/magnesium hydroxide/simethicone Suspension 30 milliLiter(s) Oral every 4 hours PRN Dyspepsia  melatonin 3 milliGRAM(s) Oral at bedtime PRN Insomnia  ondansetron Injectable 4 milliGRAM(s) IV Push every 8 hours PRN Nausea and/or Vomiting      Allergies    No Known Allergies    Intolerances        T(F): 97.8 (04-28-23 @ 10:43), Max: 98.6 (04-27-23 @ 23:48)  HR: 88 (04-28-23 @ 10:43) (85 - 93)  BP: 103/69 (04-28-23 @ 10:43) (97/58 - 111/66)  RR: 18 (04-28-23 @ 10:43) (18 - 19)  SpO2: 95% (04-28-23 @ 10:43) (94% - 96%)  Wt(kg): --    PHYSICAL EXAM:  GENERAL: NAD  HEAD:  Atraumatic, Normocephalic  EYES: PERRLA, conjunctiva and sclera clear  ENMT: Moist mucous membranes  NECK: Supple, No JVD, Normal thyroid  NERVOUS SYSTEM:  Alert & Awake  CHEST/LUNG: Clear to percussion bilaterally;   HEART: Regular rate and rhythm;   ABDOMEN: Soft, Nontender, Nondistended; Bowel sounds present  EXTREMITIES:  no edema BL LE  SKIN: moist    LABS:                        13.1   14.32 )-----------( 60       ( 28 Apr 2023 10:15 )             41.3     04-28    150<H>  |  124<H>  |  55<H>  ----------------------------<  122<H>  3.7   |  21<L>  |  3.17<H>    Ca    8.3<L>      28 Apr 2023 10:15      PTT - ( 27 Apr 2023 06:37 )  PTT:143.4 sec    Cultures;   CAPILLARY BLOOD GLUCOSE        Lipid panel:           RADIOLOGY & ADDITIONAL TESTS:    Imaging Personally Reviewed:  [x ] YES      Consultant(s) Notes Reviewed:  [x ] YES     Care Discussed with [x ] Consultants [X ] Patient [ ] Family  [x ]    [x ]  Other; RN
Patient is a 73y old  Male who presents with a chief complaint of Severe Sepsis, Hypoxic respiratory failure (2023 18:16)      OVERNIGHT EVENTS:  Patients seen and examined at bedside this morning. No acute events overnight.    REVIEW OF SYSTEMS: poor historian     MEDICATIONS  (STANDING):  heparin  Infusion.  Unit(s)/Hr (9 mL/Hr) IV Continuous <Continuous>  multivitamin 1 Tablet(s) Oral daily  pantoprazole    Tablet 40 milliGRAM(s) Oral before breakfast  phenytoin   Capsule 200 milliGRAM(s) Oral two times a day  piperacillin/tazobactam IVPB.. 3.375 Gram(s) IV Intermittent every 12 hours  potassium chloride    Tablet ER 40 milliEquivalent(s) Oral once  sodium chloride 0.45%. 1000 milliLiter(s) (100 mL/Hr) IV Continuous <Continuous>    MEDICATIONS  (PRN):  acetaminophen     Tablet .. 650 milliGRAM(s) Oral every 6 hours PRN Temp greater or equal to 38C (100.4F), Mild Pain (1 - 3)  aluminum hydroxide/magnesium hydroxide/simethicone Suspension 30 milliLiter(s) Oral every 4 hours PRN Dyspepsia  heparin   Injectable 2000 Unit(s) IV Push every 6 hours PRN For aPTT between 40 - 57  heparin   Injectable 4000 Unit(s) IV Push every 6 hours PRN For aPTT less than 40  melatonin 3 milliGRAM(s) Oral at bedtime PRN Insomnia  ondansetron Injectable 4 milliGRAM(s) IV Push every 8 hours PRN Nausea and/or Vomiting      Allergies    No Known Allergies    Intolerances        T(F): 97.6 (23 @ 10:46), Max: 102.3 (23 @ 16:27)  HR: 85 (23 @ 10:46) (74 - 119)  BP: 119/62 (23 @ 10:46) (79/39 - 136/90)  RR: 18 (23 @ 10:46) (15 - 50)  SpO2: 97% (23 @ 10:46) (83% - 100%)  Wt(kg): --    PHYSICAL EXAM:  GENERAL: NAD  HEAD:  Atraumatic, Normocephalic  EYES: PERRLA, conjunctiva and sclera clear  ENMT: Moist mucous membranes  NECK: Supple, No JVD, Normal thyroid  NERVOUS SYSTEM:  Alert & Awake  CHEST/LUNG: Clear to percussion bilaterally;   HEART: Regular rate and rhythm;   ABDOMEN: Soft, Nontender, Nondistended; Bowel sounds present  EXTREMITIES:  no edema BL LE  SKIN: moist    LABS:                        14.3   12.73 )-----------( 62       ( 2023 10:47 )             45.6         164<HH>  |  142<H>  |  71<H>  ----------------------------<  155<H>  2.9<LL>   |  19<L>  |  3.58<H>    Ca    8.2<L>      2023 10:47  Mg     2.4         TPro  7.6  /  Alb  2.3<L>  /  TBili  1.2  /  DBili  x   /  AST  96<H>  /  ALT  44  /  AlkPhos  117      PT/INR - ( 2023 15:35 )   PT: 16.3 sec;   INR: 1.35 ratio         PTT - ( 2023 10:47 )  PTT:>200 sec  Urinalysis Basic - ( 2023 16:10 )    Color: Yellow / Appearance: Slightly Turbid / S.020 / pH: x  Gluc: x / Ketone: Negative  / Bili: Small / Urobili: 4 mg/dL   Blood: x / Protein: 30 mg/dL / Nitrite: Negative   Leuk Esterase: Trace / RBC: 3-5 /HPF / WBC 3-5   Sq Epi: x / Non Sq Epi: x / Bacteria: Many      Cultures;   CAPILLARY BLOOD GLUCOSE        Lipid panel:           RADIOLOGY & ADDITIONAL TESTS:    Imaging Personally Reviewed:  [x ] YES      Consultant(s) Notes Reviewed:  [x ] YES     Care Discussed with [x ] Consultants [X ] Patient [ ] Family  [x ]    [x ]  Other; RN
Patient is a 73y old  Male who presents with a chief complaint of Severe Sepsis, Hypoxic respiratory failure (2023 14:18)      OVERNIGHT EVENTS:  Patients seen and examined at bedside this morning. No acute events overnight.    REVIEW OF SYSTEMS: poor historian    MEDICATIONS  (STANDING):  dextrose 5%. 1000 milliLiter(s) (100 mL/Hr) IV Continuous <Continuous>  heparin  Infusion.  Unit(s)/Hr (9 mL/Hr) IV Continuous <Continuous>  multivitamin 1 Tablet(s) Oral daily  pantoprazole    Tablet 40 milliGRAM(s) Oral before breakfast  phenytoin   Capsule 200 milliGRAM(s) Oral two times a day  piperacillin/tazobactam IVPB.. 3.375 Gram(s) IV Intermittent every 12 hours  potassium chloride  20 mEq/100 mL IVPB 20 milliEquivalent(s) IV Intermittent every 2 hours    MEDICATIONS  (PRN):  acetaminophen     Tablet .. 650 milliGRAM(s) Oral every 6 hours PRN Temp greater or equal to 38C (100.4F), Mild Pain (1 - 3)  aluminum hydroxide/magnesium hydroxide/simethicone Suspension 30 milliLiter(s) Oral every 4 hours PRN Dyspepsia  heparin   Injectable 4000 Unit(s) IV Push every 6 hours PRN For aPTT less than 40  heparin   Injectable 2000 Unit(s) IV Push every 6 hours PRN For aPTT between 40 - 57  melatonin 3 milliGRAM(s) Oral at bedtime PRN Insomnia  ondansetron Injectable 4 milliGRAM(s) IV Push every 8 hours PRN Nausea and/or Vomiting      Allergies    No Known Allergies    Intolerances        T(F): 97.6 (23 @ 11:24), Max: 97.6 (23 @ 00:02)  HR: 80 (23 @ 11:24) (72 - 82)  BP: 100/64 (23 @ 11:24) (98/64 - 109/71)  RR: 18 (23 @ 11:24) (18 - 19)  SpO2: 94% (23 @ 11:24) (92% - 95%)  Wt(kg): --    PHYSICAL EXAM:  GENERAL: NAD  HEAD:  Atraumatic, Normocephalic  EYES: PERRLA, conjunctiva and sclera clear  ENMT: Moist mucous membranes  NECK: Supple, No JVD, Normal thyroid  NERVOUS SYSTEM:  Alert & Awake  CHEST/LUNG: Clear to percussion bilaterally;   HEART: Regular rate and rhythm;   ABDOMEN: Soft, Nontender, Nondistended; Bowel sounds present  EXTREMITIES:  no edema BL LE  SKIN: moist    LABS:                        13.0   12.75 )-----------( 58       ( 2023 06:37 )             41.2     04    156<H>  |  132<H>  |  69<H>  ----------------------------<  138<H>  3.1<L>   |  20<L>  |  3.42<H>    Ca    8.2<L>      2023 06:37  Mg     2.4         TPro  7.6  /  Alb  2.3<L>  /  TBili  1.2  /  DBili  x   /  AST  96<H>  /  ALT  44  /  AlkPhos  117      PT/INR - ( 2023 15:35 )   PT: 16.3 sec;   INR: 1.35 ratio         PTT - ( 2023 06:37 )  PTT:143.4 sec  Urinalysis Basic - ( 2023 16:10 )    Color: Yellow / Appearance: Slightly Turbid / S.020 / pH: x  Gluc: x / Ketone: Negative  / Bili: Small / Urobili: 4 mg/dL   Blood: x / Protein: 30 mg/dL / Nitrite: Negative   Leuk Esterase: Trace / RBC: 3-5 /HPF / WBC 3-5   Sq Epi: x / Non Sq Epi: x / Bacteria: Many      Cultures;   CAPILLARY BLOOD GLUCOSE        Lipid panel:           RADIOLOGY & ADDITIONAL TESTS:    Imaging Personally Reviewed:  [x ] YES      Consultant(s) Notes Reviewed:  [x ] YES     Care Discussed with [x ] Consultants [X ] Patient [ ] Family  [x ]    [x ]  Other; RN

## 2023-04-30 NOTE — PROGRESS NOTE ADULT - TIME BILLING
min spent reviewing chart, examining patient, discussing plan with patient and family

## 2023-05-01 ENCOUNTER — TRANSCRIPTION ENCOUNTER (OUTPATIENT)
Age: 73
End: 2023-05-01

## 2023-05-01 VITALS
TEMPERATURE: 98 F | HEART RATE: 90 BPM | DIASTOLIC BLOOD PRESSURE: 69 MMHG | OXYGEN SATURATION: 94 % | SYSTOLIC BLOOD PRESSURE: 104 MMHG | RESPIRATION RATE: 18 BRPM

## 2023-05-01 LAB
ANION GAP SERPL CALC-SCNC: 4 MMOL/L — LOW (ref 5–17)
BUN SERPL-MCNC: 35 MG/DL — HIGH (ref 7–23)
CALCIUM SERPL-MCNC: 8.5 MG/DL — SIGNIFICANT CHANGE UP (ref 8.5–10.1)
CHLORIDE SERPL-SCNC: 122 MMOL/L — HIGH (ref 96–108)
CO2 SERPL-SCNC: 19 MMOL/L — LOW (ref 22–31)
CREAT SERPL-MCNC: 2.41 MG/DL — HIGH (ref 0.5–1.3)
CULTURE RESULTS: SIGNIFICANT CHANGE UP
CULTURE RESULTS: SIGNIFICANT CHANGE UP
EGFR: 28 ML/MIN/1.73M2 — LOW
FLUAV AG NPH QL: SIGNIFICANT CHANGE UP
FLUBV AG NPH QL: SIGNIFICANT CHANGE UP
GLUCOSE SERPL-MCNC: 102 MG/DL — HIGH (ref 70–99)
HCT VFR BLD CALC: 40.7 % — SIGNIFICANT CHANGE UP (ref 39–50)
HGB BLD-MCNC: 12.9 G/DL — LOW (ref 13–17)
MCHC RBC-ENTMCNC: 31.3 PG — SIGNIFICANT CHANGE UP (ref 27–34)
MCHC RBC-ENTMCNC: 31.7 G/DL — LOW (ref 32–36)
MCV RBC AUTO: 98.8 FL — SIGNIFICANT CHANGE UP (ref 80–100)
NRBC # BLD: 0 /100 WBCS — SIGNIFICANT CHANGE UP (ref 0–0)
PLATELET # BLD AUTO: 108 K/UL — LOW (ref 150–400)
POTASSIUM SERPL-MCNC: 3.7 MMOL/L — SIGNIFICANT CHANGE UP (ref 3.5–5.3)
POTASSIUM SERPL-SCNC: 3.7 MMOL/L — SIGNIFICANT CHANGE UP (ref 3.5–5.3)
RBC # BLD: 4.12 M/UL — LOW (ref 4.2–5.8)
RBC # FLD: 13.4 % — SIGNIFICANT CHANGE UP (ref 10.3–14.5)
SARS-COV-2 RNA SPEC QL NAA+PROBE: SIGNIFICANT CHANGE UP
SODIUM SERPL-SCNC: 145 MMOL/L — SIGNIFICANT CHANGE UP (ref 135–145)
SPECIMEN SOURCE: SIGNIFICANT CHANGE UP
SPECIMEN SOURCE: SIGNIFICANT CHANGE UP
WBC # BLD: 9.95 K/UL — SIGNIFICANT CHANGE UP (ref 3.8–10.5)
WBC # FLD AUTO: 9.95 K/UL — SIGNIFICANT CHANGE UP (ref 3.8–10.5)

## 2023-05-01 PROCEDURE — 99233 SBSQ HOSP IP/OBS HIGH 50: CPT

## 2023-05-01 PROCEDURE — 99497 ADVNCD CARE PLAN 30 MIN: CPT

## 2023-05-01 RX ADMIN — PANTOPRAZOLE SODIUM 40 MILLIGRAM(S): 20 TABLET, DELAYED RELEASE ORAL at 06:40

## 2023-05-01 RX ADMIN — Medication 200 MILLIGRAM(S): at 07:10

## 2023-05-01 RX ADMIN — Medication 1 TABLET(S): at 12:54

## 2023-05-01 RX ADMIN — Medication 200 MILLIGRAM(S): at 17:37

## 2023-05-01 RX ADMIN — HEPARIN SODIUM 5000 UNIT(S): 5000 INJECTION INTRAVENOUS; SUBCUTANEOUS at 06:39

## 2023-05-01 RX ADMIN — HEPARIN SODIUM 5000 UNIT(S): 5000 INJECTION INTRAVENOUS; SUBCUTANEOUS at 14:27

## 2023-05-01 NOTE — PROGRESS NOTE ADULT - STAFF/PROVIDER
March 6, 2018                 [facility]  Pediatric Neurology  [address]  [facility] [address]  Phone: [phone]   March 6, 2018     Patient: Yasmani Mcknight   YOB: 2007   Date of Visit: 3/6/2018       To Whom it May Concern:    Yasmani Mcknight was seen in  clinic on 3/6/2018. He may return to school on 3/7/18.    If you have any questions or concerns, please don't hesitate to call.    Sincerely,         Claire Borjas MA     
March 6, 2018      Tiffany Saldaña, NP  [address]  [place] [address]           [facility] - Pediatric Neurology  [address]  [place] [address]  Phone: [phone]          Patient: Yasmani Mcknight   MR Number: [id number]   YOB: 2007   Date of Visit: 3/6/2018       Dear Tiffany Saldaña:    Thank you for referring Yasmani Mcknight to me for evaluation. Attached you will find relevant portions of my assessment and plan of care.    If you have questions, please do not hesitate to call me. I look forward to following Yasmani Mcknight along with you.    Sincerely,    Ignacio Coleman II, MD    Enclosure  CC:  No Recipients    If you would like to receive this communication electronically, please contact [email or web address] or [phone] to request more information on [employer] access.    For providers and/or their staff who would like to refer a patient to [facility], please contact us through our one-stop-shop provider referral line, [facility] , at [phone].    If you feel you have received this communication in error or would no longer like to receive these types of communications, please e-mail [email or web address]         
NP Hetal

## 2023-05-01 NOTE — DISCHARGE NOTE PROVIDER - NSDCCPCAREPLAN_GEN_ALL_CORE_FT
PRINCIPAL DISCHARGE DIAGNOSIS  Diagnosis: Sepsis with acute hypoxic respiratory failure  Assessment and Plan of Treatment: 74 y/o M w/ PMHx of HTN, COPD, Dementia, Seizure disorder, CHF, Depression, BPH sent in from NH for respiratory distress, pt being admitted for Acute respiratory failure, severe sepsis due to HCAP  # Acute Hypoxic Respiratory failure  # severe sepsis likely poor PO intake   # severe dehydration (from [facility])  - CXR - clear lung  - Dimer [id number], lactate of 7 despite 2 L bolus  - d/c Heparin gtt. low probability of PE per VQ scan  - V/Q scan neg  - c/w Zosyn and Vanco in ED, off abx  - blood cultures neg  - c/w supplemental oxygen  - nutritional eval  Functional quadriplegia- supportive care   # TONNY/ATN - BL renal function unknown. c/w IVF. avoid nephrotoxins, renal eval  # Hypernatremia-  D5. trend Na  Hypokalemia- replaced, monitor   Lactic acidosis- ivf, trend lactate  Severe protein-calorie malnutrition.- nutritional suppl.  # Bacteriuria - d/c Zosyn. f/u urine cultures  # Seizure disorder - c/w Phenytoin, level 7.3  Pt DNR/DNI stable for d/c to NH, follow up w/pcp      SECONDARY DISCHARGE DIAGNOSES  Diagnosis: Sepsis with acute hypoxic respiratory failure  Assessment and Plan of Treatment:

## 2023-05-01 NOTE — PROGRESS NOTE ADULT - CONVERSATION DETAILS
Spoke with Ignacia Fajardo in the nursing office of [facility].  Patient has no known surrogates. Discussed patient's clinical course and that patient remains high risk for readmission and further decline.  Discussed he may benefit from hospice or DNH.  She said they will follow up when patient returns and can conduct an Ethics Review Committee.  She said patient likely to be discharged back today.  MITCHEL on file with DNR/I.

## 2023-05-01 NOTE — DISCHARGE NOTE NURSING/CASE MANAGEMENT/SOCIAL WORK - PATIENT PORTAL LINK FT
You can access the FollowMyHealth Patient Portal offered by [facility] by registering at the following website: [email or web address]. By joining [facility]’s FollowMyHealth portal, you will also be able to view your health information using other applications (apps) compatible with our system.

## 2023-05-01 NOTE — DISCHARGE NOTE PROVIDER - DETAILS OF MALNUTRITION DIAGNOSIS/DIAGNOSES
This patient has been assessed with a concern for Malnutrition and was treated during this hospitalization for the following Nutrition diagnosis/diagnoses:     -  04/28/2023: Severe protein-calorie malnutrition

## 2023-05-01 NOTE — PROGRESS NOTE ADULT - REASON FOR ADMISSION
Severe Sepsis, Hypoxic respiratory failure

## 2023-05-01 NOTE — DISCHARGE NOTE PROVIDER - ATTENDING DISCHARGE PHYSICAL EXAMINATION:
Vital Signs Last 24 Hrs  T(C): 36.9 (01 May 2023 11:00), Max: 36.9 (30 Apr 2023 18:00)  T(F): 98.4 (01 May 2023 11:00), Max: 98.5 (30 Apr 2023 18:00)  HR: 86 (01 May 2023 11:00) (85 - 93)  BP: 102/63 (01 May 2023 11:00) (102/63 - 108/68)  BP(mean): --  RR: 18 (01 May 2023 11:00) (18 - 18)  SpO2: 98% (01 May 2023 11:00) (89% - 98%)    Parameters below as of 01 May 2023 11:00  Patient On (Oxygen Delivery Method): nasal cannula            GENERAL: NAD  HEAD:  Atraumatic, Normocephalic  EYES: EOMI, PERRLA, conjunctiva and sclera clear  ENMT: No tonsillar erythema, exudates, or enlargement; Moist mucous membranes, Good dentition, No lesions  NECK: Supple, No JVD, Normal thyroid  NERVOUS SYSTEM:  Alert & awake, contracted  CHEST/LUNG: Clear to percussion bilaterally;   HEART: Regular rate and rhythm;   ABDOMEN: Soft, Nontender, Nondistended; Bowel sounds present  EXTREMITIES:  2+ Peripheral Pulses, No clubbing, cyanosis, or edema  SKIN: moist

## 2023-05-01 NOTE — DISCHARGE NOTE PROVIDER - HOSPITAL COURSE
74 y/o M w/ PMHx of HTN, COPD, Dementia, Seizure disorder, CHF, Depression, BPH sent in from NH for respiratory distress, pt being admitted for Acute respiratory failure, severe sepsis due to HCAP    # Acute Hypoxic Respiratory failure  # severe sepsis likely poor PO intake   # severe dehydration (from [facility])  - CXR - clear lung  - Dimer [id number], lactate of 7 despite 2 L bolus  - d/c Heparin gtt. low probability of PE per VQ scan  - V/Q scan neg  - c/w Zosyn and Vanco in ED, off abx  - blood cultures neg  - c/w supplemental oxygen  - nutritional eval  Functional quadriplegia- supportive care   # TONNY/ATN - BL renal function unknown. c/w IVF. avoid nephrotoxins, renal eval  # Hypernatremia-  D5. trend Na  Hypokalemia- replaced, monitor   Lactic acidosis- ivf, trend lactate  Severe protein-calorie malnutrition.- nutritional suppl.  # Bacteriuria - d/c Zosyn. f/u urine cultures  # Seizure disorder - c/w Phenytoin, level 7.3  Pt DNR/DNI stable for d/c to NH, follow up w/pcp

## 2023-05-01 NOTE — DISCHARGE NOTE PROVIDER - NSDCMRMEDTOKEN_GEN_ALL_CORE_FT
midodrine 10 mg oral tablet: 1 tab(s) orally once a day as needed for low BP  Multiple Vitamins oral tablet: 1 tab(s) orally once a day  omeprazole 40 mg oral delayed release capsule: 1 orally once a day  phenytoin 25 mg/mL oral suspension: 8 milliliter(s) orally 2 times a day

## 2023-05-01 NOTE — PROGRESS NOTE ADULT - PROVIDER SPECIALTY LIST ADULT
Hospitalist
Nephrology
Nephrology
Hospitalist
Nephrology
Hospitalist
Palliative Care

## 2023-05-04 DIAGNOSIS — G40.909 EPILEPSY, UNSPECIFIED, NOT INTRACTABLE, WITHOUT STATUS EPILEPTICUS: ICD-10-CM

## 2023-05-04 DIAGNOSIS — A41.9 SEPSIS, UNSPECIFIED ORGANISM: ICD-10-CM

## 2023-05-04 DIAGNOSIS — N40.0 BENIGN PROSTATIC HYPERPLASIA WITHOUT LOWER URINARY TRACT SYMPTOMS: ICD-10-CM

## 2023-05-04 DIAGNOSIS — Z66 DO NOT RESUSCITATE: ICD-10-CM

## 2023-05-04 DIAGNOSIS — E87.0 HYPEROSMOLALITY AND HYPERNATREMIA: ICD-10-CM

## 2023-05-04 DIAGNOSIS — F03.90 UNSPECIFIED DEMENTIA WITHOUT BEHAVIORAL DISTURBANCE: ICD-10-CM

## 2023-05-04 DIAGNOSIS — E86.0 DEHYDRATION: ICD-10-CM

## 2023-05-04 DIAGNOSIS — E87.20 ACIDOSIS, UNSPECIFIED: ICD-10-CM

## 2023-05-04 DIAGNOSIS — E43 UNSPECIFIED SEVERE PROTEIN-CALORIE MALNUTRITION: ICD-10-CM

## 2023-05-04 DIAGNOSIS — J44.0 CHRONIC OBSTRUCTIVE PULMONARY DISEASE WITH (ACUTE) LOWER RESPIRATORY INFECTION: ICD-10-CM

## 2023-05-04 DIAGNOSIS — Z20.822 CONTACT WITH AND (SUSPECTED) EXPOSURE TO COVID-19: ICD-10-CM

## 2023-05-04 DIAGNOSIS — E87.6 HYPOKALEMIA: ICD-10-CM

## 2023-05-04 DIAGNOSIS — J18.9 PNEUMONIA, UNSPECIFIED ORGANISM: ICD-10-CM

## 2023-05-04 DIAGNOSIS — R65.20 SEVERE SEPSIS WITHOUT SEPTIC SHOCK: ICD-10-CM

## 2023-05-04 DIAGNOSIS — R53.2 FUNCTIONAL QUADRIPLEGIA: ICD-10-CM

## 2023-05-04 DIAGNOSIS — N17.0 ACUTE KIDNEY FAILURE WITH TUBULAR NECROSIS: ICD-10-CM

## 2023-05-04 DIAGNOSIS — J96.01 ACUTE RESPIRATORY FAILURE WITH HYPOXIA: ICD-10-CM

## 2023-05-04 DIAGNOSIS — I11.0 HYPERTENSIVE HEART DISEASE WITH HEART FAILURE: ICD-10-CM

## 2023-06-08 PROBLEM — Z00.00 ENCOUNTER FOR PREVENTIVE HEALTH EXAMINATION: Status: ACTIVE | Noted: 2023-06-08

## 2024-05-23 RX ORDER — ACETAMINOPHEN 500 MG
2 TABLET ORAL
Refills: 0 | DISCHARGE

## 2024-05-23 RX ORDER — MIDODRINE HYDROCHLORIDE 2.5 MG/1
1 TABLET ORAL
Refills: 0 | DISCHARGE

## 2024-05-23 RX ORDER — OMEPRAZOLE 10 MG/1
1 CAPSULE, DELAYED RELEASE ORAL
Refills: 0 | DISCHARGE